# Patient Record
Sex: FEMALE | Race: WHITE | NOT HISPANIC OR LATINO | Employment: FULL TIME | URBAN - METROPOLITAN AREA
[De-identification: names, ages, dates, MRNs, and addresses within clinical notes are randomized per-mention and may not be internally consistent; named-entity substitution may affect disease eponyms.]

---

## 2020-06-02 ENCOUNTER — TELEPHONE (OUTPATIENT)
Dept: OBSTETRICS AND GYNECOLOGY | Facility: CLINIC | Age: 55
End: 2020-06-02

## 2020-06-02 NOTE — TELEPHONE ENCOUNTER
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

## 2020-08-04 ENCOUNTER — OFFICE VISIT (OUTPATIENT)
Dept: OBSTETRICS AND GYNECOLOGY | Facility: CLINIC | Age: 55
End: 2020-08-04
Payer: COMMERCIAL

## 2020-08-04 VITALS
DIASTOLIC BLOOD PRESSURE: 88 MMHG | SYSTOLIC BLOOD PRESSURE: 138 MMHG | BODY MASS INDEX: 18.16 KG/M2 | HEIGHT: 66 IN | WEIGHT: 113 LBS

## 2020-08-04 DIAGNOSIS — Z01.419 ENCOUNTER FOR ANNUAL ROUTINE GYNECOLOGICAL EXAMINATION: Primary | ICD-10-CM

## 2020-08-04 DIAGNOSIS — Z11.51 ENCOUNTER FOR SCREENING FOR HUMAN PAPILLOMAVIRUS (HPV): ICD-10-CM

## 2020-08-04 DIAGNOSIS — N94.10 FEMALE DYSPAREUNIA: ICD-10-CM

## 2020-08-04 DIAGNOSIS — Z12.31 ENCOUNTER FOR SCREENING MAMMOGRAM FOR BREAST CANCER: ICD-10-CM

## 2020-08-04 DIAGNOSIS — N95.1 PERIMENOPAUSE: ICD-10-CM

## 2020-08-04 DIAGNOSIS — Z12.4 ENCOUNTER FOR PAPANICOLAOU SMEAR FOR CERVICAL CANCER SCREENING: ICD-10-CM

## 2020-08-04 DIAGNOSIS — Z30.41 ENCOUNTER FOR SURVEILLANCE OF CONTRACEPTIVE PILLS: ICD-10-CM

## 2020-08-04 DIAGNOSIS — N39.3 STRESS INCONTINENCE: ICD-10-CM

## 2020-08-04 PROBLEM — Z80.0 FAMILY HISTORY OF COLON CANCER: Status: ACTIVE | Noted: 2020-08-04

## 2020-08-04 PROCEDURE — 88175 CYTOPATH C/V AUTO FLUID REDO: CPT

## 2020-08-04 PROCEDURE — 99396 PR PREVENTIVE VISIT,EST,40-64: ICD-10-PCS | Mod: S$GLB,,, | Performed by: OBSTETRICS & GYNECOLOGY

## 2020-08-04 PROCEDURE — 87624 HPV HI-RISK TYP POOLED RSLT: CPT

## 2020-08-04 PROCEDURE — 99999 PR PBB SHADOW E&M-EST. PATIENT-LVL III: ICD-10-PCS | Mod: PBBFAC,,, | Performed by: OBSTETRICS & GYNECOLOGY

## 2020-08-04 PROCEDURE — 3008F BODY MASS INDEX DOCD: CPT | Mod: CPTII,S$GLB,, | Performed by: OBSTETRICS & GYNECOLOGY

## 2020-08-04 PROCEDURE — 99999 PR PBB SHADOW E&M-EST. PATIENT-LVL III: CPT | Mod: PBBFAC,,, | Performed by: OBSTETRICS & GYNECOLOGY

## 2020-08-04 PROCEDURE — 87186 SC STD MICRODIL/AGAR DIL: CPT

## 2020-08-04 PROCEDURE — 87088 URINE BACTERIA CULTURE: CPT

## 2020-08-04 PROCEDURE — 3008F PR BODY MASS INDEX (BMI) DOCUMENTED: ICD-10-PCS | Mod: CPTII,S$GLB,, | Performed by: OBSTETRICS & GYNECOLOGY

## 2020-08-04 PROCEDURE — 87086 URINE CULTURE/COLONY COUNT: CPT

## 2020-08-04 PROCEDURE — 87077 CULTURE AEROBIC IDENTIFY: CPT

## 2020-08-04 PROCEDURE — 99396 PREV VISIT EST AGE 40-64: CPT | Mod: S$GLB,,, | Performed by: OBSTETRICS & GYNECOLOGY

## 2020-08-04 RX ORDER — ESTRADIOL 10 UG/1
1 INSERT VAGINAL NIGHTLY
Qty: 7 TABLET | Refills: 0 | Status: SHIPPED | OUTPATIENT
Start: 2020-08-04 | End: 2020-08-11

## 2020-08-04 RX ORDER — NORETHINDRONE ACETATE AND ETHINYL ESTRADIOL, ETHINYL ESTRADIOL AND FERROUS FUMARATE 1MG-10(24)
1 KIT ORAL DAILY
Qty: 84 TABLET | Refills: 3 | Status: SHIPPED | OUTPATIENT
Start: 2020-08-04 | End: 2021-09-28

## 2020-08-04 RX ORDER — LORATADINE 10 MG/1
10 TABLET ORAL DAILY
COMMUNITY

## 2020-08-04 RX ORDER — NORETHINDRONE ACETATE AND ETHINYL ESTRADIOL, ETHINYL ESTRADIOL AND FERROUS FUMARATE 1MG-10(24)
KIT ORAL
COMMUNITY
Start: 2020-05-02 | End: 2020-08-04 | Stop reason: SDUPTHER

## 2020-08-04 RX ORDER — DOCUSATE SODIUM 100 MG/1
100 CAPSULE, LIQUID FILLED ORAL
COMMUNITY
End: 2022-07-11

## 2020-08-04 RX ORDER — ESTRADIOL 10 UG/1
1 INSERT VAGINAL
Qty: 24 TABLET | Refills: 3 | Status: SHIPPED | OUTPATIENT
Start: 2020-08-06 | End: 2021-07-07 | Stop reason: SDUPTHER

## 2020-08-04 NOTE — PROGRESS NOTES
Chief Complaint: Well Woman Exam   Patient known to me.  HPI:      Erica Swann is a 54 y.o.  who presents for annual exam. She is currently complaining of problems with bladder.    She has had problems with her bladder since having children but worsening in past few years. Complains of leak with cough, sneeze or any activity. She had done Kegels with little improvement. No prior PFT or Urogyn evaluation. Has history of a fibroid pushing on bladder and feels urgency when lying down on her back. No hot flashes yet but does get pain with sex which is likely result of low-dose OCP and perimenopausal changes.    Ms. Swann is currently sexually active with a single male partner. She declines STD screening today. Patient has regular monthly menses which are very light if any bleeding on pill. Patient's last menstrual period was 2020 (approximate). She is currently using oral contraceptives (estrogen/progesterone) for contraception.    Previous Pap:  no abnormalities (No result found) 2018 at .  Previous Mammogram: 10/2019  Colonoscopy: with Dr. Zamorano in the past 5-7 years. Will call to check when due next.    Gardasil:Has never had     OB History        5    Para   1    Term   1            AB   4    Living   1       SAB   3    TAB        Ectopic   1    Multiple        Live Births   1             No abnormal pap or STDs    ROS:     GENERAL: Denies unintentional weight gain or weight loss. Feeling well overall.   SKIN: Denies rash or lesions.   HEENT: Denies headaches, or vision changes.   CARDIOVASCULAR: Denies palpitations or chest pain.   RESPIRATORY: Denies shortness of breath or dyspnea on exertion.  BREASTS: Denies pain, lumps, or nipple discharge.   ABDOMEN: Denies abdominal pain, constipation, diarrhea, nausea, vomiting, or change in appetite.   URINARY:Frequency, urgency when lying down, leak with cough, sneeze or moderate exertion/lifting.  NEUROLOGIC: Denies syncope or  "weakness.   PSYCHIATRIC: Denies depression, anxiety or mood swings.    Physical Exam:      PHYSICAL EXAM:  /88   Ht 5' 6" (1.676 m)   Wt 51.3 kg (113 lb)   LMP 07/21/2020 (Approximate)   BMI 18.24 kg/m²   Body mass index is 18.24 kg/m².     APPEARANCE: Well nourished, well developed, in no acute distress.  PSYCH: Appropriate mood and affect.  SKIN: No acne or hirsutism  NECK: Neck symmetric without masses or thyromegaly  NODES: No inguinal, axillary, or supraclavicular lymph node enlargement  CHEST: Normal respiratory effort.  ABDOMEN: Soft.  No tenderness or masses.   BREASTS: Symmetrical, no skin changes or visible lesions.  No palpable masses or nipple discharge bilaterally.  PELVIC: Normal external genitalia without lesions.  Normal hair distribution.  Adequate perineal body, normal urethral meatus.  Vagina with early atrophic changes and without lesions or discharge.  Cervix pink, without lesions, discharge or tenderness.  No significant cystocele or rectocele.  Bimanual exam shows uterus to be normal size with about 3-4cm anterior fibroid pushing into posterior bladder, mobile and nontender.  Adnexa without masses or tenderness.    EXTREMITIES: No edema.    Assessment/Plan:     Encounter for annual routine gynecological examination  Normal exam today with uterine fibroid noted as in past. Pap smear and HPV done. Mammgoram ordered. Discussed OCP continuance versus discontinuing and risks and benefits of both. She desires to continue the OCP. Reviewed stress incontinence and likely increased urge from location of fibroid. Will check urine culture and refer to PFT to see if can improve without surgery. Will also start Yuvafem for dyspareunia associated with vaginal atrophy likely to do with perimenopausal changes. Patient will send update.    Encounter for Papanicolaou smear for cervical cancer screening  -     Liquid-Based Pap Smear, Screening    Encounter for screening for human papillomavirus " (HPV)  -     HPV High Risk Genotypes, PCR    Encounter for screening mammogram for breast cancer  -     Mammo Digital Screening Bilat w/ Tariq; Future; Expected date: 08/04/2020    Encounter for surveillance of contraceptive pills  -     LO LOESTRIN FE 1 mg-10 mcg (24)/10 mcg (2) Tab; Take 1 tablet by mouth once daily.  Dispense: 84 tablet; Refill: 3    Stress incontinence  -     Urine culture  -     Ambulatory referral/consult to Physical/Occupational Therapy; Future; Expected date: 08/11/2020    Perimenopause  -     estradioL (YUVAFEM) 10 mcg Tab; Place 1 tablet (10 mcg total) vaginally every evening. for 7 days  Dispense: 7 tablet; Refill: 0  -     estradioL (YUVAFEM) 10 mcg Tab; Place 1 tablet (10 mcg total) vaginally twice a week.  Dispense: 24 tablet; Refill: 3    Female dyspareunia  -     Ambulatory referral/consult to Physical/Occupational Therapy; Future; Expected date: 08/11/2020  -     estradioL (YUVAFEM) 10 mcg Tab; Place 1 tablet (10 mcg total) vaginally every evening. for 7 days  Dispense: 7 tablet; Refill: 0  -     estradioL (YUVAFEM) 10 mcg Tab; Place 1 tablet (10 mcg total) vaginally twice a week.  Dispense: 24 tablet; Refill: 3    RTC 1 year    Counseling:     Patient was counseled today on current ASCCP pap guidelines, the recommendation for yearly pelvic exams, healthy diet and exercise routines, breast self awareness and annual mammograms. She is to see her PCP for other health maintenance.       Use of the [employer] Patient Portal discussed and encouraged during today's visit.       Nely Corley MD

## 2020-08-09 LAB — BACTERIA UR CULT: ABNORMAL

## 2020-08-11 ENCOUNTER — TELEPHONE (OUTPATIENT)
Dept: FAMILY MEDICINE | Facility: CLINIC | Age: 55
End: 2020-08-11

## 2020-08-11 DIAGNOSIS — N30.00 ACUTE CYSTITIS WITHOUT HEMATURIA: Primary | ICD-10-CM

## 2020-08-11 RX ORDER — NITROFURANTOIN 25; 75 MG/1; MG/1
100 CAPSULE ORAL 2 TIMES DAILY
Qty: 14 CAPSULE | Refills: 0 | Status: SHIPPED | OUTPATIENT
Start: 2020-08-11 | End: 2020-08-18

## 2020-08-11 NOTE — PROGRESS NOTES
Please notify that urine culture did show a low level of infection and would like to treat since she has had problems with her bladder. Sending Rx for Macrobid to the pharmacy. She will take twice daily for one week.  Maxine

## 2020-08-11 NOTE — TELEPHONE ENCOUNTER
----- Message from Nely Corley MD sent at 8/11/2020 11:02 AM CDT -----  Please notify that urine culture did show a low level of infection and would like to treat since she has had problems with her bladder. Sending Rx for Macrobid to the pharmacy. She will take twice daily for one week.  Maxine

## 2020-08-13 LAB
HPV HR 12 DNA SPEC QL NAA+PROBE: NEGATIVE
HPV16 AG SPEC QL: NEGATIVE
HPV18 DNA SPEC QL NAA+PROBE: NEGATIVE

## 2020-08-19 LAB
FINAL PATHOLOGIC DIAGNOSIS: NORMAL
Lab: NORMAL

## 2020-09-16 ENCOUNTER — TELEPHONE (OUTPATIENT)
Dept: OBSTETRICS AND GYNECOLOGY | Facility: CLINIC | Age: 55
End: 2020-09-16

## 2020-09-16 NOTE — TELEPHONE ENCOUNTER
----- Message from Nely Corley MD sent at 9/15/2020  9:38 AM CDT -----  Regarding: Colonoscopy  Please notify records received from GI and colonoscopy was July of 2014. Repeat was recommended in 5 years which would have been last July (2019). She can schedule on her own. Let us know if she needs any help with a new referral.   Maxine

## 2020-09-17 ENCOUNTER — CLINICAL SUPPORT (OUTPATIENT)
Dept: OTHER | Facility: CLINIC | Age: 55
End: 2020-09-17
Payer: COMMERCIAL

## 2020-09-17 DIAGNOSIS — Z00.8 ENCOUNTER FOR OTHER GENERAL EXAMINATION: ICD-10-CM

## 2020-10-02 VITALS — HEIGHT: 62 IN | BODY MASS INDEX: 20.67 KG/M2

## 2020-10-02 LAB
HDLC SERPL-MCNC: 101 MG/DL
POC CHOLESTEROL, TOTAL: 238 MG/DL
POC GLUCOSE, FASTING: 90 MG/DL (ref 60–110)
TRIGL SERPL-MCNC: 83 MG/DL

## 2020-10-19 ENCOUNTER — TELEPHONE (OUTPATIENT)
Dept: OBSTETRICS AND GYNECOLOGY | Facility: CLINIC | Age: 55
End: 2020-10-19

## 2020-10-19 DIAGNOSIS — B37.31 CANDIDAL VAGINITIS: Primary | ICD-10-CM

## 2020-10-19 NOTE — TELEPHONE ENCOUNTER
----- Message from Bianca Olguin sent at 10/19/2020 11:42 AM CDT -----  Type:  Needs Medical Advice    Who Called: Erica  Symptoms (please be specific): pt has a yeast infection and wants to know if Dr. Corley can call in a prescription for her   How long has patient had these symptoms:   2 days  Pharmacy name and phone #:   [id number] - [place], [place] - [phone] AT [facility] & [address] [phone] (Phone)  [phone] (Fax)  Would the patient rather a call back or a response via [facility]? Call back  Best Call Back Number: [phone]  Additional Information: none

## 2020-10-19 NOTE — TELEPHONE ENCOUNTER
Pt c/o vaginal itching, burning, small amount of thick, yellow discharge x2 days. She denies odor or any bladder symptoms. Pt advised to keep area clean and dry, wear loose fitting bottoms and cotton underwear, use pH balanced soap, and try AZO yeast tablets.     She is requesting diflucan because monistat has not worked well in the past.

## 2020-10-20 RX ORDER — FLUCONAZOLE 150 MG/1
150 TABLET ORAL
Qty: 2 TABLET | Refills: 0 | Status: SHIPPED | OUTPATIENT
Start: 2020-10-20 | End: 2020-10-24

## 2020-10-20 NOTE — TELEPHONE ENCOUNTER
Pt notified med sent to pharmacy and to follow up if symptoms unresolved after taking medication.

## 2020-10-30 ENCOUNTER — TELEPHONE (OUTPATIENT)
Dept: OBSTETRICS AND GYNECOLOGY | Facility: CLINIC | Age: 55
End: 2020-10-30

## 2020-10-30 NOTE — TELEPHONE ENCOUNTER
----- Message from Danielle Fonseca sent at 10/30/2020  2:31 PM CDT -----  Contact: [phone]/Self  Type:  Sooner Appointment Request     Caller is requesting a sooner appointment.  Caller declined first available appointment listed below.  Caller will not accept being placed on the waitlist and is requesting a message be sent to doctor.  Name of Caller: pt  When is the first available appointment? 11-  Symptoms or Reason: hemorrhoids    Would the patient rather a call back or a response via [facility]? Call back  Best Call Back Number: [phone]  Additional Information:

## 2020-12-22 ENCOUNTER — APPOINTMENT (OUTPATIENT)
Dept: RADIOLOGY | Facility: OTHER | Age: 55
End: 2020-12-22
Attending: OBSTETRICS & GYNECOLOGY
Payer: COMMERCIAL

## 2020-12-22 VITALS — HEIGHT: 62 IN | WEIGHT: 113.13 LBS | BODY MASS INDEX: 20.82 KG/M2

## 2020-12-22 DIAGNOSIS — Z12.31 ENCOUNTER FOR SCREENING MAMMOGRAM FOR BREAST CANCER: ICD-10-CM

## 2020-12-22 PROCEDURE — 77063 MAMMO DIGITAL SCREENING BILAT WITH TOMOSYNTHESIS_CAD: ICD-10-PCS | Mod: 26,,, | Performed by: RADIOLOGY

## 2020-12-22 PROCEDURE — 77067 MAMMO DIGITAL SCREENING BILAT WITH TOMOSYNTHESIS_CAD: ICD-10-PCS | Mod: 26,,, | Performed by: RADIOLOGY

## 2020-12-22 PROCEDURE — 77063 BREAST TOMOSYNTHESIS BI: CPT | Mod: 26,,, | Performed by: RADIOLOGY

## 2020-12-22 PROCEDURE — 77067 SCR MAMMO BI INCL CAD: CPT | Mod: TC,PN

## 2020-12-22 PROCEDURE — 77067 SCR MAMMO BI INCL CAD: CPT | Mod: 26,,, | Performed by: RADIOLOGY

## 2021-01-15 ENCOUNTER — TELEPHONE (OUTPATIENT)
Dept: OBSTETRICS AND GYNECOLOGY | Facility: CLINIC | Age: 56
End: 2021-01-15

## 2021-01-25 ENCOUNTER — TELEPHONE (OUTPATIENT)
Dept: RADIOLOGY | Facility: OTHER | Age: 56
End: 2021-01-25

## 2021-02-05 ENCOUNTER — TELEPHONE (OUTPATIENT)
Dept: RADIOLOGY | Facility: OTHER | Age: 56
End: 2021-02-05

## 2021-02-23 ENCOUNTER — PATIENT MESSAGE (OUTPATIENT)
Dept: OBSTETRICS AND GYNECOLOGY | Facility: CLINIC | Age: 56
End: 2021-02-23

## 2021-04-12 ENCOUNTER — PATIENT MESSAGE (OUTPATIENT)
Dept: RESEARCH | Facility: HOSPITAL | Age: 56
End: 2021-04-12

## 2021-06-14 ENCOUNTER — TELEPHONE (OUTPATIENT)
Dept: OBSTETRICS AND GYNECOLOGY | Facility: CLINIC | Age: 56
End: 2021-06-14

## 2021-06-14 DIAGNOSIS — R92.8 ABNORMAL MAMMOGRAM: Primary | ICD-10-CM

## 2021-07-06 ENCOUNTER — OFFICE VISIT (OUTPATIENT)
Dept: OBSTETRICS AND GYNECOLOGY | Facility: CLINIC | Age: 56
End: 2021-07-06
Payer: COMMERCIAL

## 2021-07-06 VITALS
DIASTOLIC BLOOD PRESSURE: 90 MMHG | BODY MASS INDEX: 19.71 KG/M2 | WEIGHT: 107.13 LBS | SYSTOLIC BLOOD PRESSURE: 132 MMHG | HEIGHT: 62 IN

## 2021-07-06 DIAGNOSIS — N94.10 FEMALE DYSPAREUNIA: ICD-10-CM

## 2021-07-06 DIAGNOSIS — Z01.419 ENCOUNTER FOR ANNUAL ROUTINE GYNECOLOGICAL EXAMINATION: Primary | ICD-10-CM

## 2021-07-06 DIAGNOSIS — N95.1 MENOPAUSAL STATE: ICD-10-CM

## 2021-07-06 PROCEDURE — 3008F BODY MASS INDEX DOCD: CPT | Mod: CPTII,S$GLB,, | Performed by: OBSTETRICS & GYNECOLOGY

## 2021-07-06 PROCEDURE — 99396 PREV VISIT EST AGE 40-64: CPT | Mod: S$GLB,,, | Performed by: OBSTETRICS & GYNECOLOGY

## 2021-07-06 PROCEDURE — 1126F AMNT PAIN NOTED NONE PRSNT: CPT | Mod: S$GLB,,, | Performed by: OBSTETRICS & GYNECOLOGY

## 2021-07-06 PROCEDURE — 99396 PR PREVENTIVE VISIT,EST,40-64: ICD-10-PCS | Mod: S$GLB,,, | Performed by: OBSTETRICS & GYNECOLOGY

## 2021-07-06 PROCEDURE — 99999 PR PBB SHADOW E&M-EST. PATIENT-LVL III: ICD-10-PCS | Mod: PBBFAC,,, | Performed by: OBSTETRICS & GYNECOLOGY

## 2021-07-06 PROCEDURE — 1126F PR PAIN SEVERITY QUANTIFIED, NO PAIN PRESENT: ICD-10-PCS | Mod: S$GLB,,, | Performed by: OBSTETRICS & GYNECOLOGY

## 2021-07-06 PROCEDURE — 99999 PR PBB SHADOW E&M-EST. PATIENT-LVL III: CPT | Mod: PBBFAC,,, | Performed by: OBSTETRICS & GYNECOLOGY

## 2021-07-06 PROCEDURE — 3008F PR BODY MASS INDEX (BMI) DOCUMENTED: ICD-10-PCS | Mod: CPTII,S$GLB,, | Performed by: OBSTETRICS & GYNECOLOGY

## 2021-07-06 RX ORDER — GABAPENTIN 100 MG/1
CAPSULE ORAL
COMMUNITY
Start: 2021-06-28

## 2021-07-06 RX ORDER — ESCITALOPRAM OXALATE 5 MG/1
TABLET ORAL
COMMUNITY
Start: 2021-04-30

## 2021-07-07 RX ORDER — ESTRADIOL 10 UG/1
1 INSERT VAGINAL
Qty: 24 TABLET | Refills: 3 | Status: SHIPPED | OUTPATIENT
Start: 2021-07-08 | End: 2022-07-08

## 2021-07-14 ENCOUNTER — TELEPHONE (OUTPATIENT)
Dept: OBSTETRICS AND GYNECOLOGY | Facility: CLINIC | Age: 56
End: 2021-07-14

## 2021-08-19 ENCOUNTER — LAB VISIT (OUTPATIENT)
Dept: LAB | Facility: HOSPITAL | Age: 56
End: 2021-08-19
Attending: OBSTETRICS & GYNECOLOGY
Payer: COMMERCIAL

## 2021-08-19 DIAGNOSIS — N95.1 MENOPAUSAL STATE: ICD-10-CM

## 2021-08-19 PROCEDURE — 82670 ASSAY OF TOTAL ESTRADIOL: CPT | Performed by: OBSTETRICS & GYNECOLOGY

## 2021-08-19 PROCEDURE — 83001 ASSAY OF GONADOTROPIN (FSH): CPT | Performed by: OBSTETRICS & GYNECOLOGY

## 2021-08-20 LAB
ESTRADIOL SERPL-MCNC: <10 PG/ML
FSH SERPL-ACNC: 71.88 MIU/ML

## 2021-09-27 ENCOUNTER — TELEPHONE (OUTPATIENT)
Dept: OBSTETRICS AND GYNECOLOGY | Facility: CLINIC | Age: 56
End: 2021-09-27

## 2021-09-28 ENCOUNTER — OFFICE VISIT (OUTPATIENT)
Dept: OBSTETRICS AND GYNECOLOGY | Facility: CLINIC | Age: 56
End: 2021-09-28
Payer: COMMERCIAL

## 2021-09-28 VITALS
DIASTOLIC BLOOD PRESSURE: 90 MMHG | WEIGHT: 108.38 LBS | HEIGHT: 62 IN | SYSTOLIC BLOOD PRESSURE: 130 MMHG | BODY MASS INDEX: 19.94 KG/M2

## 2021-09-28 DIAGNOSIS — N95.1 MENOPAUSAL STATE: Primary | ICD-10-CM

## 2021-09-28 PROCEDURE — 1159F PR MEDICATION LIST DOCUMENTED IN MEDICAL RECORD: ICD-10-PCS | Mod: CPTII,S$GLB,, | Performed by: OBSTETRICS & GYNECOLOGY

## 2021-09-28 PROCEDURE — 3080F PR MOST RECENT DIASTOLIC BLOOD PRESSURE >= 90 MM HG: ICD-10-PCS | Mod: CPTII,S$GLB,, | Performed by: OBSTETRICS & GYNECOLOGY

## 2021-09-28 PROCEDURE — 99999 PR PBB SHADOW E&M-EST. PATIENT-LVL III: ICD-10-PCS | Mod: PBBFAC,,, | Performed by: OBSTETRICS & GYNECOLOGY

## 2021-09-28 PROCEDURE — 99999 PR PBB SHADOW E&M-EST. PATIENT-LVL III: CPT | Mod: PBBFAC,,, | Performed by: OBSTETRICS & GYNECOLOGY

## 2021-09-28 PROCEDURE — 99213 OFFICE O/P EST LOW 20 MIN: CPT | Mod: S$GLB,,, | Performed by: OBSTETRICS & GYNECOLOGY

## 2021-09-28 PROCEDURE — 3075F SYST BP GE 130 - 139MM HG: CPT | Mod: CPTII,S$GLB,, | Performed by: OBSTETRICS & GYNECOLOGY

## 2021-09-28 PROCEDURE — 1159F MED LIST DOCD IN RCRD: CPT | Mod: CPTII,S$GLB,, | Performed by: OBSTETRICS & GYNECOLOGY

## 2021-09-28 PROCEDURE — 3008F PR BODY MASS INDEX (BMI) DOCUMENTED: ICD-10-PCS | Mod: CPTII,S$GLB,, | Performed by: OBSTETRICS & GYNECOLOGY

## 2021-09-28 PROCEDURE — 1160F PR REVIEW ALL MEDS BY PRESCRIBER/CLIN PHARMACIST DOCUMENTED: ICD-10-PCS | Mod: CPTII,S$GLB,, | Performed by: OBSTETRICS & GYNECOLOGY

## 2021-09-28 PROCEDURE — 3075F PR MOST RECENT SYSTOLIC BLOOD PRESS GE 130-139MM HG: ICD-10-PCS | Mod: CPTII,S$GLB,, | Performed by: OBSTETRICS & GYNECOLOGY

## 2021-09-28 PROCEDURE — 1160F RVW MEDS BY RX/DR IN RCRD: CPT | Mod: CPTII,S$GLB,, | Performed by: OBSTETRICS & GYNECOLOGY

## 2021-09-28 PROCEDURE — 3008F BODY MASS INDEX DOCD: CPT | Mod: CPTII,S$GLB,, | Performed by: OBSTETRICS & GYNECOLOGY

## 2021-09-28 PROCEDURE — 99213 PR OFFICE/OUTPT VISIT, EST, LEVL III, 20-29 MIN: ICD-10-PCS | Mod: S$GLB,,, | Performed by: OBSTETRICS & GYNECOLOGY

## 2021-09-28 PROCEDURE — 3080F DIAST BP >= 90 MM HG: CPT | Mod: CPTII,S$GLB,, | Performed by: OBSTETRICS & GYNECOLOGY

## 2021-10-09 ENCOUNTER — IMMUNIZATION (OUTPATIENT)
Dept: PRIMARY CARE CLINIC | Facility: CLINIC | Age: 56
End: 2021-10-09
Payer: COMMERCIAL

## 2021-10-09 DIAGNOSIS — Z23 NEED FOR VACCINATION: Primary | ICD-10-CM

## 2021-10-09 PROCEDURE — 91300 COVID-19, MRNA, LNP-S, PF, 30 MCG/0.3 ML DOSE VACCINE: CPT | Mod: PBBFAC | Performed by: INTERNAL MEDICINE

## 2021-10-09 PROCEDURE — 0003A COVID-19, MRNA, LNP-S, PF, 30 MCG/0.3 ML DOSE VACCINE: CPT | Mod: CV19,PBBFAC | Performed by: INTERNAL MEDICINE

## 2021-10-22 ENCOUNTER — OFFICE VISIT (OUTPATIENT)
Dept: OBSTETRICS AND GYNECOLOGY | Facility: CLINIC | Age: 56
End: 2021-10-22
Payer: COMMERCIAL

## 2021-10-22 DIAGNOSIS — Z79.890 HORMONE REPLACEMENT THERAPY: ICD-10-CM

## 2021-10-22 DIAGNOSIS — N95.1 MENOPAUSAL STATE: Primary | ICD-10-CM

## 2021-10-22 PROCEDURE — 99212 OFFICE O/P EST SF 10 MIN: CPT | Mod: 95,,, | Performed by: OBSTETRICS & GYNECOLOGY

## 2021-10-22 PROCEDURE — 1160F RVW MEDS BY RX/DR IN RCRD: CPT | Mod: CPTII,95,, | Performed by: OBSTETRICS & GYNECOLOGY

## 2021-10-22 PROCEDURE — 99212 PR OFFICE/OUTPT VISIT, EST, LEVL II, 10-19 MIN: ICD-10-PCS | Mod: 95,,, | Performed by: OBSTETRICS & GYNECOLOGY

## 2021-10-22 PROCEDURE — 1160F PR REVIEW ALL MEDS BY PRESCRIBER/CLIN PHARMACIST DOCUMENTED: ICD-10-PCS | Mod: CPTII,95,, | Performed by: OBSTETRICS & GYNECOLOGY

## 2021-10-22 PROCEDURE — 1159F MED LIST DOCD IN RCRD: CPT | Mod: CPTII,95,, | Performed by: OBSTETRICS & GYNECOLOGY

## 2021-10-22 PROCEDURE — 1159F PR MEDICATION LIST DOCUMENTED IN MEDICAL RECORD: ICD-10-PCS | Mod: CPTII,95,, | Performed by: OBSTETRICS & GYNECOLOGY

## 2021-10-22 RX ORDER — ESTRADIOL 1 MG/1
1 TABLET ORAL DAILY
Qty: 90 TABLET | Refills: 3 | Status: SHIPPED | OUTPATIENT
Start: 2021-10-22 | End: 2022-07-28 | Stop reason: SDUPTHER

## 2021-10-22 RX ORDER — PROGESTERONE 100 MG/1
100 CAPSULE ORAL NIGHTLY
Qty: 90 CAPSULE | Refills: 3 | Status: SHIPPED | OUTPATIENT
Start: 2021-10-22 | End: 2022-07-28 | Stop reason: SDUPTHER

## 2021-11-08 ENCOUNTER — PATIENT MESSAGE (OUTPATIENT)
Dept: OBSTETRICS AND GYNECOLOGY | Facility: CLINIC | Age: 56
End: 2021-11-08
Payer: COMMERCIAL

## 2021-11-08 DIAGNOSIS — N60.01 BREAST CYST, RIGHT: Primary | ICD-10-CM

## 2022-01-06 ENCOUNTER — LAB VISIT (OUTPATIENT)
Dept: PRIMARY CARE CLINIC | Facility: CLINIC | Age: 57
End: 2022-01-06
Payer: COMMERCIAL

## 2022-01-06 DIAGNOSIS — Z20.822 CONTACT WITH AND (SUSPECTED) EXPOSURE TO COVID-19: ICD-10-CM

## 2022-01-06 LAB
CTP QC/QA: YES
SARS-COV-2 AG RESP QL IA.RAPID: NEGATIVE

## 2022-01-06 PROCEDURE — 87811 SARS-COV-2 COVID19 W/OPTIC: CPT

## 2022-02-25 ENCOUNTER — TELEPHONE (OUTPATIENT)
Dept: OBSTETRICS AND GYNECOLOGY | Facility: CLINIC | Age: 57
End: 2022-02-25
Payer: COMMERCIAL

## 2022-02-25 DIAGNOSIS — Z12.31 BREAST CANCER SCREENING BY MAMMOGRAM: Primary | ICD-10-CM

## 2022-02-25 DIAGNOSIS — N60.01 BREAST CYST, RIGHT: ICD-10-CM

## 2022-02-28 ENCOUNTER — TELEPHONE (OUTPATIENT)
Dept: OBSTETRICS AND GYNECOLOGY | Facility: CLINIC | Age: 57
End: 2022-02-28
Payer: COMMERCIAL

## 2022-02-28 DIAGNOSIS — R92.8 ABNORMAL MAMMOGRAM: Primary | ICD-10-CM

## 2022-05-15 ENCOUNTER — IMMUNIZATION (OUTPATIENT)
Dept: PRIMARY CARE CLINIC | Facility: CLINIC | Age: 57
End: 2022-05-15
Payer: COMMERCIAL

## 2022-05-15 DIAGNOSIS — Z23 NEED FOR VACCINATION: Primary | ICD-10-CM

## 2022-05-15 PROCEDURE — 91305 COVID-19, MRNA, LNP-S, PF, 30 MCG/0.3 ML DOSE VACCINE (PFIZER): CPT | Mod: PBBFAC | Performed by: INTERNAL MEDICINE

## 2022-07-07 ENCOUNTER — PATIENT MESSAGE (OUTPATIENT)
Dept: ENDOCRINOLOGY | Facility: CLINIC | Age: 57
End: 2022-07-07
Payer: COMMERCIAL

## 2022-07-11 ENCOUNTER — OFFICE VISIT (OUTPATIENT)
Dept: ENDOCRINOLOGY | Facility: CLINIC | Age: 57
End: 2022-07-11
Payer: COMMERCIAL

## 2022-07-11 VITALS
SYSTOLIC BLOOD PRESSURE: 136 MMHG | WEIGHT: 111.31 LBS | HEIGHT: 62 IN | DIASTOLIC BLOOD PRESSURE: 88 MMHG | HEART RATE: 75 BPM | OXYGEN SATURATION: 98 % | BODY MASS INDEX: 20.48 KG/M2

## 2022-07-11 DIAGNOSIS — E89.40 ASYMPTOMATIC POSTSURGICAL MENOPAUSE: ICD-10-CM

## 2022-07-11 DIAGNOSIS — E05.90 HYPERTHYROIDISM: Primary | ICD-10-CM

## 2022-07-11 DIAGNOSIS — E04.1 THYROID NODULE: ICD-10-CM

## 2022-07-11 DIAGNOSIS — E05.90 SUBCLINICAL HYPERTHYROIDISM: ICD-10-CM

## 2022-07-11 PROCEDURE — 1159F MED LIST DOCD IN RCRD: CPT | Mod: CPTII,S$GLB,, | Performed by: INTERNAL MEDICINE

## 2022-07-11 PROCEDURE — 3075F PR MOST RECENT SYSTOLIC BLOOD PRESS GE 130-139MM HG: ICD-10-PCS | Mod: CPTII,S$GLB,, | Performed by: INTERNAL MEDICINE

## 2022-07-11 PROCEDURE — 3079F PR MOST RECENT DIASTOLIC BLOOD PRESSURE 80-89 MM HG: ICD-10-PCS | Mod: CPTII,S$GLB,, | Performed by: INTERNAL MEDICINE

## 2022-07-11 PROCEDURE — 3079F DIAST BP 80-89 MM HG: CPT | Mod: CPTII,S$GLB,, | Performed by: INTERNAL MEDICINE

## 2022-07-11 PROCEDURE — 99204 OFFICE O/P NEW MOD 45 MIN: CPT | Mod: S$GLB,,, | Performed by: INTERNAL MEDICINE

## 2022-07-11 PROCEDURE — 3008F BODY MASS INDEX DOCD: CPT | Mod: CPTII,S$GLB,, | Performed by: INTERNAL MEDICINE

## 2022-07-11 PROCEDURE — 1159F PR MEDICATION LIST DOCUMENTED IN MEDICAL RECORD: ICD-10-PCS | Mod: CPTII,S$GLB,, | Performed by: INTERNAL MEDICINE

## 2022-07-11 PROCEDURE — 99999 PR PBB SHADOW E&M-EST. PATIENT-LVL IV: ICD-10-PCS | Mod: PBBFAC,,, | Performed by: INTERNAL MEDICINE

## 2022-07-11 PROCEDURE — 1160F RVW MEDS BY RX/DR IN RCRD: CPT | Mod: CPTII,S$GLB,, | Performed by: INTERNAL MEDICINE

## 2022-07-11 PROCEDURE — 3075F SYST BP GE 130 - 139MM HG: CPT | Mod: CPTII,S$GLB,, | Performed by: INTERNAL MEDICINE

## 2022-07-11 PROCEDURE — 99204 PR OFFICE/OUTPT VISIT, NEW, LEVL IV, 45-59 MIN: ICD-10-PCS | Mod: S$GLB,,, | Performed by: INTERNAL MEDICINE

## 2022-07-11 PROCEDURE — 1160F PR REVIEW ALL MEDS BY PRESCRIBER/CLIN PHARMACIST DOCUMENTED: ICD-10-PCS | Mod: CPTII,S$GLB,, | Performed by: INTERNAL MEDICINE

## 2022-07-11 PROCEDURE — 99999 PR PBB SHADOW E&M-EST. PATIENT-LVL IV: CPT | Mod: PBBFAC,,, | Performed by: INTERNAL MEDICINE

## 2022-07-11 PROCEDURE — 3008F PR BODY MASS INDEX (BMI) DOCUMENTED: ICD-10-PCS | Mod: CPTII,S$GLB,, | Performed by: INTERNAL MEDICINE

## 2022-07-11 NOTE — ASSESSMENT & PLAN NOTE
Discussed possible etiologies including Graves' disease, thyroid nodules, or thyroiditis as well as indications for treatment including age >65, TSH <0.1, a fib, osteoporosis or symptoms. Currently without indication for treatment although will obtain DXA. May also normalize thyroid function to facilitate FNA

## 2022-07-11 NOTE — PROGRESS NOTES
Erica Swann is a 56 y.o. female referred by [facility] Self for evaluation of thyroid nodule, subclinical hyperthyroidism    History of Present Illness  Thyroid nodule  Found on exam by internist    Had US at [facility] with left lobe nodule measuring 2.6 x 1.1 x 1.2 cm- oval circumscribed hypoechoic solid nodule with heterogeneous parenchyma and increased vascularity  TIRADS 5 and FNA recommended    Labs with subclinical hyperthyroidism as below. Denies prior history of thyroid disease and previous TSHs all normal    Denies symptoms of hyperthyroidism    Risk Factors for Thyroid Cancer:  Hx of External Beam Radiation:denies  Family Hx of Thyroid Cancer: denies    Denies rapid neck enlargement, difficulty swallowing, SOB, or hoarseness.     Father maybe with nodule    6/30/22:  TSH 0.34  FT4 0.9    6/15/22:  TSH 0.34    7/6/22:  TSH 0.22    On HRT prescribed by gyn      Current Outpatient Medications:     calcium carbonate/vitamin D3 (CALCIUM WITH VITAMIN D ORAL), Take by mouth., Disp: , Rfl:     EScitalopram oxalate (LEXAPRO) 5 MG Tab, , Disp: , Rfl:     estradioL (ESTRACE) 1 MG tablet, Take 1 tablet (1 mg total) by mouth once daily., Disp: 90 tablet, Rfl: 3    gabapentin (NEURONTIN) 100 MG capsule, Take by mouth., Disp: , Rfl:     linaCLOtide (LINZESS) 72 mcg Cap capsule, Take 72 mcg by mouth before breakfast., Disp: , Rfl:     loratadine (CLARITIN) 10 mg tablet, Take 10 mg by mouth once daily., Disp: , Rfl:     progesterone (PROMETRIUM) 100 MG capsule, Take 1 capsule (100 mg total) by mouth nightly., Disp: 90 capsule, Rfl: 3    triamcinolone acetonide (NASACORT NASL), by Nasal route., Disp: , Rfl:     docusate sodium (COLACE) 100 MG capsule, Take 100 mg by mouth., Disp: , Rfl:     ROS as above    Objective:     Vitals:    07/11/22 0847   BP: 136/88   Pulse: 75     Wt Readings from Last 3 Encounters:   07/11/22 50.5 kg (111 lb 5.3 oz)   09/28/21 49.2 kg (108 lb 5.7 oz)   07/06/21 48.6 kg (107 lb 2.3 oz)      Body mass index is 20.36 kg/m².  Physical Exam  Constitutional:       Appearance: She is well-developed.   HENT:      Head: Normocephalic.   Eyes:      Conjunctiva/sclera: Conjunctivae normal.   Neck:      Comments: No tenderness  Left lobe nodule   Pulmonary:      Effort: Pulmonary effort is normal.   Musculoskeletal:         General: Normal range of motion.   Skin:     General: Skin is warm.      Findings: No rash.   Neurological:      Mental Status: She is alert and oriented to person, place, and time.         Labs    Chemistry    No results found for: NA, K, CL, CO2, BUN, CREATININE, GLU No results found for: CALCIUM, ALKPHOS, AST, ALT, BILITOT, ESTGFRAFRICA, EGFRNONAA           Assessment and Plan     Thyroid nodule  US reviewed and with left lobe nodule meeting FNA criteria  No compressive symptoms  TSH suppressed. Will further evaluate with uptake and scan but even if hot nodule would likely FNA given features but would first normalize thyroid function  Procedure reviewed in detail  Discussed possible outcomes of FNA including malignancy, benign, unsatisfactory, indeterminate  Discussed indications for surgery      Subclinical hyperthyroidism  Discussed possible etiologies including Graves' disease, thyroid nodules, or thyroiditis as well as indications for treatment including age >65, TSH <0.1, a fib, osteoporosis or symptoms. Currently without indication for treatment although will obtain DXA. May also normalize thyroid function to facilitate FNA          RTC 6 months        Erin Morales MD

## 2022-07-11 NOTE — ASSESSMENT & PLAN NOTE
US reviewed and with left lobe nodule meeting FNA criteria  No compressive symptoms  TSH suppressed. Will further evaluate with uptake and scan but even if hot nodule would likely FNA given features but would first normalize thyroid function  Procedure reviewed in detail  Discussed possible outcomes of FNA including malignancy, benign, unsatisfactory, indeterminate  Discussed indications for surgery

## 2022-07-14 ENCOUNTER — APPOINTMENT (OUTPATIENT)
Dept: RADIOLOGY | Facility: CLINIC | Age: 57
End: 2022-07-14
Attending: INTERNAL MEDICINE
Payer: COMMERCIAL

## 2022-07-14 DIAGNOSIS — E89.40 ASYMPTOMATIC POSTSURGICAL MENOPAUSE: ICD-10-CM

## 2022-07-14 PROCEDURE — 77080 DXA BONE DENSITY AXIAL: CPT | Mod: 26,,, | Performed by: INTERNAL MEDICINE

## 2022-07-14 PROCEDURE — 77080 DEXA BONE DENSITY SPINE HIP: ICD-10-PCS | Mod: 26,,, | Performed by: INTERNAL MEDICINE

## 2022-07-14 PROCEDURE — 77080 DXA BONE DENSITY AXIAL: CPT | Mod: TC,PO

## 2022-07-28 ENCOUNTER — OFFICE VISIT (OUTPATIENT)
Dept: OBSTETRICS AND GYNECOLOGY | Facility: CLINIC | Age: 57
End: 2022-07-28
Payer: COMMERCIAL

## 2022-07-28 ENCOUNTER — HOSPITAL ENCOUNTER (OUTPATIENT)
Dept: RADIOLOGY | Facility: HOSPITAL | Age: 57
Discharge: HOME | End: 2022-07-28
Attending: INTERNAL MEDICINE
Payer: COMMERCIAL

## 2022-07-28 VITALS
HEIGHT: 62 IN | BODY MASS INDEX: 20.47 KG/M2 | WEIGHT: 111.25 LBS | DIASTOLIC BLOOD PRESSURE: 70 MMHG | SYSTOLIC BLOOD PRESSURE: 120 MMHG

## 2022-07-28 DIAGNOSIS — Z79.890 HORMONE REPLACEMENT THERAPY: ICD-10-CM

## 2022-07-28 DIAGNOSIS — E05.90 HYPERTHYROIDISM: ICD-10-CM

## 2022-07-28 DIAGNOSIS — N95.1 MENOPAUSAL STATE: ICD-10-CM

## 2022-07-28 DIAGNOSIS — Z01.419 ENCOUNTER FOR ANNUAL ROUTINE GYNECOLOGICAL EXAMINATION: Primary | ICD-10-CM

## 2022-07-28 PROCEDURE — 1160F PR REVIEW ALL MEDS BY PRESCRIBER/CLIN PHARMACIST DOCUMENTED: ICD-10-PCS | Mod: CPTII,S$GLB,, | Performed by: OBSTETRICS & GYNECOLOGY

## 2022-07-28 PROCEDURE — 3074F PR MOST RECENT SYSTOLIC BLOOD PRESSURE < 130 MM HG: ICD-10-PCS | Mod: CPTII,S$GLB,, | Performed by: OBSTETRICS & GYNECOLOGY

## 2022-07-28 PROCEDURE — 99396 PREV VISIT EST AGE 40-64: CPT | Mod: S$GLB,,, | Performed by: OBSTETRICS & GYNECOLOGY

## 2022-07-28 PROCEDURE — 3078F PR MOST RECENT DIASTOLIC BLOOD PRESSURE < 80 MM HG: ICD-10-PCS | Mod: CPTII,S$GLB,, | Performed by: OBSTETRICS & GYNECOLOGY

## 2022-07-28 PROCEDURE — 1160F RVW MEDS BY RX/DR IN RCRD: CPT | Mod: CPTII,S$GLB,, | Performed by: OBSTETRICS & GYNECOLOGY

## 2022-07-28 PROCEDURE — 1159F MED LIST DOCD IN RCRD: CPT | Mod: CPTII,S$GLB,, | Performed by: OBSTETRICS & GYNECOLOGY

## 2022-07-28 PROCEDURE — 3078F DIAST BP <80 MM HG: CPT | Mod: CPTII,S$GLB,, | Performed by: OBSTETRICS & GYNECOLOGY

## 2022-07-28 PROCEDURE — 99396 PR PREVENTIVE VISIT,EST,40-64: ICD-10-PCS | Mod: S$GLB,,, | Performed by: OBSTETRICS & GYNECOLOGY

## 2022-07-28 PROCEDURE — 3008F BODY MASS INDEX DOCD: CPT | Mod: CPTII,S$GLB,, | Performed by: OBSTETRICS & GYNECOLOGY

## 2022-07-28 PROCEDURE — 3008F PR BODY MASS INDEX (BMI) DOCUMENTED: ICD-10-PCS | Mod: CPTII,S$GLB,, | Performed by: OBSTETRICS & GYNECOLOGY

## 2022-07-28 PROCEDURE — 78014 THYROID IMAGING W/BLOOD FLOW: CPT | Mod: 26,,, | Performed by: RADIOLOGY

## 2022-07-28 PROCEDURE — 78014 NM THYROID UPTAKE AND SCAN: ICD-10-PCS | Mod: 26,,, | Performed by: RADIOLOGY

## 2022-07-28 PROCEDURE — 1159F PR MEDICATION LIST DOCUMENTED IN MEDICAL RECORD: ICD-10-PCS | Mod: CPTII,S$GLB,, | Performed by: OBSTETRICS & GYNECOLOGY

## 2022-07-28 PROCEDURE — A9516 IODINE I-123 SOD IODIDE MIC: HCPCS

## 2022-07-28 PROCEDURE — 3074F SYST BP LT 130 MM HG: CPT | Mod: CPTII,S$GLB,, | Performed by: OBSTETRICS & GYNECOLOGY

## 2022-07-28 PROCEDURE — 99999 PR PBB SHADOW E&M-EST. PATIENT-LVL III: ICD-10-PCS | Mod: PBBFAC,,, | Performed by: OBSTETRICS & GYNECOLOGY

## 2022-07-28 PROCEDURE — 99999 PR PBB SHADOW E&M-EST. PATIENT-LVL III: CPT | Mod: PBBFAC,,, | Performed by: OBSTETRICS & GYNECOLOGY

## 2022-07-28 RX ORDER — PROGESTERONE 100 MG/1
100 CAPSULE ORAL NIGHTLY
Qty: 90 CAPSULE | Refills: 3 | Status: SHIPPED | OUTPATIENT
Start: 2022-07-28 | End: 2023-07-17 | Stop reason: SDUPTHER

## 2022-07-28 RX ORDER — ESTRADIOL 1 MG/1
1 TABLET ORAL DAILY
Qty: 90 TABLET | Refills: 3 | Status: SHIPPED | OUTPATIENT
Start: 2022-07-28 | End: 2023-07-17 | Stop reason: SDUPTHER

## 2022-07-28 RX ORDER — ESTRADIOL 10 UG/1
INSERT VAGINAL
COMMUNITY
Start: 2022-05-17 | End: 2022-08-22

## 2022-07-28 RX ORDER — TRETINOIN 0.25 MG/G
CREAM TOPICAL
COMMUNITY
Start: 2022-07-26

## 2022-07-29 ENCOUNTER — HOSPITAL ENCOUNTER (OUTPATIENT)
Dept: RADIOLOGY | Facility: HOSPITAL | Age: 57
Discharge: HOME | End: 2022-07-29
Attending: INTERNAL MEDICINE
Payer: COMMERCIAL

## 2022-08-05 ENCOUNTER — PATIENT MESSAGE (OUTPATIENT)
Dept: ENDOCRINOLOGY | Facility: CLINIC | Age: 57
End: 2022-08-05
Payer: COMMERCIAL

## 2022-08-08 ENCOUNTER — PATIENT MESSAGE (OUTPATIENT)
Dept: ENDOCRINOLOGY | Facility: CLINIC | Age: 57
End: 2022-08-08
Payer: COMMERCIAL

## 2022-08-12 ENCOUNTER — OFFICE VISIT (OUTPATIENT)
Dept: ENDOCRINOLOGY | Facility: CLINIC | Age: 57
End: 2022-08-12
Payer: COMMERCIAL

## 2022-08-12 DIAGNOSIS — E05.90 HYPERTHYROIDISM: Primary | ICD-10-CM

## 2022-08-12 DIAGNOSIS — M85.80 OSTEOPENIA, UNSPECIFIED LOCATION: ICD-10-CM

## 2022-08-12 DIAGNOSIS — E05.90 SUBCLINICAL HYPERTHYROIDISM: ICD-10-CM

## 2022-08-12 DIAGNOSIS — E04.1 THYROID NODULE: ICD-10-CM

## 2022-08-12 PROCEDURE — 1159F MED LIST DOCD IN RCRD: CPT | Mod: CPTII,95,, | Performed by: INTERNAL MEDICINE

## 2022-08-12 PROCEDURE — 99214 OFFICE O/P EST MOD 30 MIN: CPT | Mod: 95,,, | Performed by: INTERNAL MEDICINE

## 2022-08-12 PROCEDURE — 1159F PR MEDICATION LIST DOCUMENTED IN MEDICAL RECORD: ICD-10-PCS | Mod: CPTII,95,, | Performed by: INTERNAL MEDICINE

## 2022-08-12 PROCEDURE — 1160F PR REVIEW ALL MEDS BY PRESCRIBER/CLIN PHARMACIST DOCUMENTED: ICD-10-PCS | Mod: CPTII,95,, | Performed by: INTERNAL MEDICINE

## 2022-08-12 PROCEDURE — 99214 PR OFFICE/OUTPT VISIT, EST, LEVL IV, 30-39 MIN: ICD-10-PCS | Mod: 95,,, | Performed by: INTERNAL MEDICINE

## 2022-08-12 PROCEDURE — 1160F RVW MEDS BY RX/DR IN RCRD: CPT | Mod: CPTII,95,, | Performed by: INTERNAL MEDICINE

## 2022-08-12 NOTE — PROGRESS NOTES
Erica Swann is a 56 y.o. 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, thyroid nodule    The patient location is: work in [place]  The chief complaint leading to consultation is:   Chief Complaint   Patient presents with    Thyroid Nodule    Hyperthyroidism       Visit type: audiovisual    Face to Face time with patient: 15 minutes  25 minutes of total time spent on the encounter, which includes face to face time and non-face to face time preparing to see the patient (eg, review of tests), Obtaining and/or reviewing separately obtained history, Documenting clinical information in the electronic or other health record, Independently interpreting results (not separately reported) and communicating results to the patient/family/caregiver, or Care coordination (not separately reported).    Each patient to whom he or she provides medical services by telemedicine is:  (1) informed of the relationship between the physician and patient and the respective role of any other health care provider with respect to management of the patient; and (2) notified that he or she may decline to receive medical services by telemedicine and may withdraw from such care at any time.      History of Present Illness  Thyroid nodule  Found on exam by internist    Had [facility] at [facility] with left lobe nodule measuring 2.6 x 1.1 x 1.2 cm- oval circumscribed hypoechoic solid nodule with heterogeneous parenchyma and increased vascularity  TIRADS 5 and FNA recommended    Labs with subclinical hyperthyroidism as below beginning in June 2022. Denies prior history of thyroid disease and previous TSHs all normal    Denies symptoms of hyperthyroidism    Risk Factors for Thyroid Cancer:  Hx of External Beam Radiation:denies  Family Hx of Thyroid Cancer: denies    Denies rapid neck enlargement, difficulty swallowing, SOB, or hoarseness.     Father maybe with nodule    Thyroid uptake and scan 7/2022:  FINDINGS:  The 24 hour  uptake is normal at 14.8 % (normal 10-30%).   Thyroid gland is normal shape and size.  There is focally increased uptake at the pole of the left thyroid lobe with relative suppression of uptake in the remainder of the gland.     Impression:   1. Normal 24 hour radioiodine uptake by the thyroid.  2. 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    No results found for: TSH    6/30/22:  TSH 0.34  FT4 0.9    6/15/22:  TSH 0.34    7/6/22:  TSH 0.22    On HRT prescribed by gyn      Current Outpatient Medications:     calcium carbonate/vitamin D3 (CALCIUM WITH VITAMIN D ORAL), Take by mouth., Disp: , Rfl:     EScitalopram oxalate (LEXAPRO) 5 MG Tab, , Disp: , Rfl:     estradioL (ESTRACE) 1 MG tablet, Take 1 tablet (1 mg total) by mouth once daily., Disp: 90 tablet, Rfl: 3    estradioL (VAGIFEM) 10 mcg Tab, , Disp: , Rfl:     gabapentin (NEURONTIN) 100 MG capsule, Take by mouth., Disp: , Rfl:     loratadine (CLARITIN) 10 mg tablet, Take 10 mg by mouth once daily., Disp: , Rfl:     progesterone (PROMETRIUM) 100 MG capsule, Take 1 capsule (100 mg total) by mouth nightly., Disp: 90 capsule, Rfl: 3    tretinoin (RETIN-A) 0.025 % cream, , Disp: , Rfl:     triamcinolone acetonide (NASACORT NASL), by Nasal route., Disp: , Rfl:     ROS as above    Objective:     There were no vitals filed for this visit.  Wt Readings from Last 3 Encounters:   07/28/22 50.4 kg (111 lb 3.6 oz)   07/11/22 50.5 kg (111 lb 5.3 oz)   09/28/21 49.2 kg (108 lb 5.7 oz)     There is no height or weight on file to calculate BMI.    Labs    Chemistry    No results found for: NA, K, CL, CO2, BUN, CREATININE, GLU No results found for: CALCIUM, ALKPHOS, AST, ALT, BILITOT, ESTGFRAFRICA, EGFRNONAA           Assessment and Plan     Subclinical hyperthyroidism  Due to toxic adenoma  Discussed indications for treatment which she does not meet at this time although with osteopenia. Will likely need treatment in future as toxic adenoma  would not resolve without treatment  Reviewed treatment options for hyperthyroidism with definitive treatment best for adenoma  She will get disc of images for me to review. If needs FNA of other nodules will arrange (one 1.2 cm hypoechoic nodule per report) as would determine if needs surgery before planning for ZHOU  No urgency for treatment at this time  TSH in 3 months. Reviewed symptoms of hyperthyroidism        Thyroid nodule  Nodule for which FNA recommended is hot and given very low risk of malignancy will hold off on FNA  Review images once she brings disc to determine if another nodule needs FNA    Osteopenia  Does not meet criteria for treatment but is getting bone protection from estrogen  Repeat in 2 years  If progresses to osteoporosis particularly while on treatment with estrogen would treat hyperthyroidism        RTC pending above        Erin Morales MD

## 2022-08-12 NOTE — ASSESSMENT & PLAN NOTE
Does not meet criteria for treatment but is getting bone protection from estrogen  Repeat in 2 years  If progresses to osteoporosis particularly while on treatment with estrogen would treat hyperthyroidism

## 2022-08-12 NOTE — ASSESSMENT & PLAN NOTE
Nodule for which FNA recommended is hot and given very low risk of malignancy will hold off on FNA  Review images once she brings disc to determine if another nodule needs FNA

## 2022-08-12 NOTE — ASSESSMENT & PLAN NOTE
Due to toxic adenoma  Discussed indications for treatment which she does not meet at this time although with osteopenia. Will likely need treatment in future as toxic adenoma would not resolve without treatment  Reviewed treatment options for hyperthyroidism with definitive treatment best for adenoma  She will get disc of images for me to review. If needs FNA of other nodules will arrange (one 1.2 cm hypoechoic nodule per report) as would determine if needs surgery before planning for ZHOU  No urgency for treatment at this time  TSH in 3 months. Reviewed symptoms of hyperthyroidism

## 2022-08-25 ENCOUNTER — TELEPHONE (OUTPATIENT)
Dept: ENDOCRINOLOGY | Facility: CLINIC | Age: 57
End: 2022-08-25
Payer: COMMERCIAL

## 2022-08-25 DIAGNOSIS — E05.90 SUBCLINICAL HYPERTHYROIDISM: Primary | ICD-10-CM

## 2022-08-25 DIAGNOSIS — E04.1 THYROID NODULE: ICD-10-CM

## 2022-08-25 RX ORDER — METHIMAZOLE 5 MG/1
5 TABLET ORAL DAILY
Qty: 30 TABLET | Refills: 11 | Status: SHIPPED | OUTPATIENT
Start: 2022-08-25 | End: 2022-11-30

## 2022-08-25 NOTE — TELEPHONE ENCOUNTER
US reviewed and although nodule hot it has high risk features and is TIRADS 5. Will plan to normalize thyroid function with methimazole 5 mg daily and then obtain FNA with further management pending results    Labs 6 weeks  FNA end of December

## 2022-09-04 NOTE — PROGRESS NOTES
"Chief Complaint: Well Woman Exam     HPI:      Erica Swann is a 56 y.o.  who presents today for well woman exam.  LMP: Patient's last menstrual period was 2020 (approximate).  No issues, problems, or complaints. Specifically, patient denies abnormal vaginal bleeding, discharge, pelvic pain, urinary problems, or changes in appetite. Ms. Swann is currently sexually active with a single male partner. She declines STD screening today. Patient does not have regular monthly menses. Patient's last menstrual period was 2020 (approximate).  Menopausal complaints: none on HRT    Previous Pap: no abnormalities  (2020)  Previous Mammogram: 3/2/22: [facility] 2  Colonoscopy: 2021 polyp (10yr)      OB History          5    Para   1    Term   1            AB   4    Living   1         SAB   3    IAB        Ectopic   1    Multiple        Live Births   1                 GYN History  Age of Menarche:15  Age at first pregnancy:    Age at first live birth:42        ROS:     GENERAL: Denies unintentional weight gain or weight loss. Feeling well overall.   SKIN: Denies rash or lesions.   HEENT: Denies headaches, or vision changes.   CARDIOVASCULAR: Denies palpitations or chest pain.   RESPIRATORY: Denies shortness of breath or dyspnea on exertion.  BREASTS: Denies pain, lumps, or nipple discharge.   ABDOMEN: Denies abdominal pain, constipation, diarrhea, nausea, vomiting, change in appetite.  URINARY: Denies frequency, dysuria, hematuria.  NEUROLOGIC: Denies syncope or weakness.   PSYCHIATRIC: Denies depression, anxiety or mood swings.    Physical Exam:      PHYSICAL EXAM:  /70   Ht 5' 2" (1.575 m)   Wt 50.4 kg (111 lb 3.6 oz)   LMP 2020 (Approximate)   BMI 20.34 kg/m²   Body mass index is 20.34 kg/m².     APPEARANCE: Well nourished, well developed, in no acute distress.  PSYCH: Appropriate mood and affect.  SKIN: No acne or hirsutism  NECK: Neck symmetric without masses or " thyromegaly  NODES: No inguinal, axillary, or supraclavicular lymph node enlargement  ABDOMEN: Soft.  No tenderness or masses.    CARDIOVASCULAR: No edema of peripheral extremities  BREASTS: Symmetrical, no skin changes or visible lesions.  No palpable masses or nipple discharge bilaterally.  PELVIC: Normal external genitalia without lesions.  Normal hair distribution.  Adequate perineal body, normal urethral meatus.  Vagina moist and well rugated without lesions or discharge.  Cervix pink, without lesions, discharge or tenderness.  No significant cystocele or rectocele.  Bimanual exam shows uterus to be normal size, regular, mobile and nontender.  Adnexa without masses or tenderness.      Assessment/Plan:     Encounter for annual routine gynecological examination  Normal exam today. Pap smear up to date and normal. Mammogram up to date and normal. Osteoporosis prevention reviewed. No menopausal symptoms on HRT regimen and desires refill. Risks/benefits/use reviewed. Other health maintenance up to date per PCP.     Menopausal state  -     progesterone (PROMETRIUM) 100 MG capsule; Take 1 capsule (100 mg total) by mouth nightly.  Dispense: 90 capsule; Refill: 3  -     estradioL (ESTRACE) 1 MG tablet; Take 1 tablet (1 mg total) by mouth once daily.  Dispense: 90 tablet; Refill: 3    Hormone replacement therapy  -     progesterone (PROMETRIUM) 100 MG capsule; Take 1 capsule (100 mg total) by mouth nightly.  Dispense: 90 capsule; Refill: 3  -     estradioL (ESTRACE) 1 MG tablet; Take 1 tablet (1 mg total) by mouth once daily.  Dispense: 90 tablet; Refill: 3    RTC 1 year    Counseling:     Patient was counseled today on current ASCCP pap guidelines, the recommendation for yearly pelvic exams, healthy diet and exercise routines, breast self awareness and annual mammograms.She is to see her PCP for other health maintenance.     Use of the [employer] Patient Portal discussed and encouraged during today's visit.       Nely  MD Barney      As of April 1, 2021, the Cures Act has been passed nationally. This new law requires that all doctors progress notes, lab results, pathology reports and radiology reports be released IMMEDIATELY to the patient in the patient portal. That means that the results are released to you at the EXACT same time they are released to me. Therefore, with all of the patients that I have I am not able to reply to each patient exactly when the results come in. So there will be a delay from when you see the results to when I see them and have time to come up with a response to send you. Also I only see these results when I am on the computer at work. So if the results come in over the weekend or after 5 pm of a work day, I will not see them until the next business day. As you can tell, this is a challenge as a physician to give every patient the quick response they hope for and deserve. So please be patient! Thanks for understanding, Dr. Corley

## 2022-10-11 ENCOUNTER — LAB VISIT (OUTPATIENT)
Dept: LAB | Facility: HOSPITAL | Age: 57
End: 2022-10-11
Attending: INTERNAL MEDICINE
Payer: COMMERCIAL

## 2022-10-11 DIAGNOSIS — E05.90 HYPERTHYROIDISM: ICD-10-CM

## 2022-10-11 DIAGNOSIS — E05.90 SUBCLINICAL HYPERTHYROIDISM: ICD-10-CM

## 2022-10-11 PROCEDURE — 36415 COLL VENOUS BLD VENIPUNCTURE: CPT | Mod: PO | Performed by: INTERNAL MEDICINE

## 2022-10-11 PROCEDURE — 84443 ASSAY THYROID STIM HORMONE: CPT | Performed by: INTERNAL MEDICINE

## 2022-10-11 PROCEDURE — 80053 COMPREHEN METABOLIC PANEL: CPT | Performed by: INTERNAL MEDICINE

## 2022-10-12 ENCOUNTER — PATIENT MESSAGE (OUTPATIENT)
Dept: ENDOCRINOLOGY | Facility: CLINIC | Age: 57
End: 2022-10-12
Payer: COMMERCIAL

## 2022-10-12 DIAGNOSIS — E05.90 SUBCLINICAL HYPERTHYROIDISM: Primary | ICD-10-CM

## 2022-10-12 LAB
ALBUMIN SERPL BCP-MCNC: 3.8 G/DL (ref 3.5–5.2)
ALP SERPL-CCNC: 45 U/L (ref 55–135)
ALT SERPL W/O P-5'-P-CCNC: 19 U/L (ref 10–44)
ANION GAP SERPL CALC-SCNC: 12 MMOL/L (ref 8–16)
AST SERPL-CCNC: 21 U/L (ref 10–40)
BILIRUB SERPL-MCNC: 0.3 MG/DL (ref 0.1–1)
BUN SERPL-MCNC: 14 MG/DL (ref 6–20)
CALCIUM SERPL-MCNC: 9.1 MG/DL (ref 8.7–10.5)
CHLORIDE SERPL-SCNC: 104 MMOL/L (ref 95–110)
CO2 SERPL-SCNC: 26 MMOL/L (ref 23–29)
CREAT SERPL-MCNC: 0.8 MG/DL (ref 0.5–1.4)
EST. GFR  (NO RACE VARIABLE): >60 ML/MIN/1.73 M^2
GLUCOSE SERPL-MCNC: 86 MG/DL (ref 70–110)
POTASSIUM SERPL-SCNC: 3.8 MMOL/L (ref 3.5–5.1)
PROT SERPL-MCNC: 6.7 G/DL (ref 6–8.4)
SODIUM SERPL-SCNC: 142 MMOL/L (ref 136–145)
TSH SERPL DL<=0.005 MIU/L-ACNC: 0.86 UIU/ML (ref 0.4–4)
TSH SERPL DL<=0.005 MIU/L-ACNC: 0.86 UIU/ML (ref 0.4–4)

## 2022-10-18 ENCOUNTER — PATIENT MESSAGE (OUTPATIENT)
Dept: ENDOCRINOLOGY | Facility: CLINIC | Age: 57
End: 2022-10-18
Payer: COMMERCIAL

## 2022-10-18 DIAGNOSIS — E04.1 THYROID NODULE: Primary | ICD-10-CM

## 2022-10-24 ENCOUNTER — PATIENT MESSAGE (OUTPATIENT)
Dept: ENDOCRINOLOGY | Facility: CLINIC | Age: 57
End: 2022-10-24
Payer: COMMERCIAL

## 2022-11-03 ENCOUNTER — PATIENT MESSAGE (OUTPATIENT)
Dept: ENDOCRINOLOGY | Facility: CLINIC | Age: 57
End: 2022-11-03
Payer: COMMERCIAL

## 2022-11-03 ENCOUNTER — TELEPHONE (OUTPATIENT)
Dept: ENDOCRINOLOGY | Facility: CLINIC | Age: 57
End: 2022-11-03
Payer: COMMERCIAL

## 2022-11-03 NOTE — TELEPHONE ENCOUNTER
----- Message from Elly Carrera sent at 11/3/2022 11:14 AM CDT -----  Regarding: Returning Call  Contact: Pt @[phone]  Pt is requesting a call back Regarding Rescheduling Biopsy please call to discuss further.

## 2022-11-11 ENCOUNTER — PATIENT MESSAGE (OUTPATIENT)
Dept: OBSTETRICS AND GYNECOLOGY | Facility: CLINIC | Age: 57
End: 2022-11-11
Payer: COMMERCIAL

## 2022-11-11 DIAGNOSIS — N95.0 PMB (POSTMENOPAUSAL BLEEDING): Primary | ICD-10-CM

## 2022-11-14 ENCOUNTER — OFFICE VISIT (OUTPATIENT)
Dept: OBSTETRICS AND GYNECOLOGY | Facility: CLINIC | Age: 57
End: 2022-11-14
Payer: COMMERCIAL

## 2022-11-14 VITALS — BODY MASS INDEX: 20.34 KG/M2 | DIASTOLIC BLOOD PRESSURE: 70 MMHG | SYSTOLIC BLOOD PRESSURE: 112 MMHG | HEIGHT: 62 IN

## 2022-11-14 DIAGNOSIS — N95.0 PMB (POSTMENOPAUSAL BLEEDING): Primary | ICD-10-CM

## 2022-11-14 DIAGNOSIS — N76.0 BACTERIAL VAGINITIS: ICD-10-CM

## 2022-11-14 DIAGNOSIS — B96.89 BACTERIAL VAGINITIS: ICD-10-CM

## 2022-11-14 LAB
BILIRUB SERPL-MCNC: NORMAL MG/DL
BLOOD URINE, POC: NORMAL
CLARITY, POC UA: CLEAR
COLOR, POC UA: YELLOW
GLUCOSE UR QL STRIP: NORMAL
KETONES UR QL STRIP: NORMAL
LEUKOCYTE ESTERASE URINE, POC: NORMAL
NITRITE, POC UA: NORMAL
PH, POC UA: 6
PROTEIN, POC: NORMAL
SPECIFIC GRAVITY, POC UA: 1.01
UROBILINOGEN, POC UA: NORMAL

## 2022-11-14 PROCEDURE — 1159F PR MEDICATION LIST DOCUMENTED IN MEDICAL RECORD: ICD-10-PCS | Mod: CPTII,S$GLB,, | Performed by: OBSTETRICS & GYNECOLOGY

## 2022-11-14 PROCEDURE — 99999 PR PBB SHADOW E&M-EST. PATIENT-LVL III: CPT | Mod: PBBFAC,,, | Performed by: OBSTETRICS & GYNECOLOGY

## 2022-11-14 PROCEDURE — 99214 PR OFFICE/OUTPT VISIT, EST, LEVL IV, 30-39 MIN: ICD-10-PCS | Mod: S$GLB,,, | Performed by: OBSTETRICS & GYNECOLOGY

## 2022-11-14 PROCEDURE — 1160F RVW MEDS BY RX/DR IN RCRD: CPT | Mod: CPTII,S$GLB,, | Performed by: OBSTETRICS & GYNECOLOGY

## 2022-11-14 PROCEDURE — 99214 OFFICE O/P EST MOD 30 MIN: CPT | Mod: S$GLB,,, | Performed by: OBSTETRICS & GYNECOLOGY

## 2022-11-14 PROCEDURE — 3008F PR BODY MASS INDEX (BMI) DOCUMENTED: ICD-10-PCS | Mod: CPTII,S$GLB,, | Performed by: OBSTETRICS & GYNECOLOGY

## 2022-11-14 PROCEDURE — 1159F MED LIST DOCD IN RCRD: CPT | Mod: CPTII,S$GLB,, | Performed by: OBSTETRICS & GYNECOLOGY

## 2022-11-14 PROCEDURE — 1160F PR REVIEW ALL MEDS BY PRESCRIBER/CLIN PHARMACIST DOCUMENTED: ICD-10-PCS | Mod: CPTII,S$GLB,, | Performed by: OBSTETRICS & GYNECOLOGY

## 2022-11-14 PROCEDURE — 3008F BODY MASS INDEX DOCD: CPT | Mod: CPTII,S$GLB,, | Performed by: OBSTETRICS & GYNECOLOGY

## 2022-11-14 PROCEDURE — 3078F PR MOST RECENT DIASTOLIC BLOOD PRESSURE < 80 MM HG: ICD-10-PCS | Mod: CPTII,S$GLB,, | Performed by: OBSTETRICS & GYNECOLOGY

## 2022-11-14 PROCEDURE — 81002 POCT URINE DIPSTICK WITHOUT MICROSCOPE: ICD-10-PCS | Mod: S$GLB,,, | Performed by: OBSTETRICS & GYNECOLOGY

## 2022-11-14 PROCEDURE — 99999 PR PBB SHADOW E&M-EST. PATIENT-LVL III: ICD-10-PCS | Mod: PBBFAC,,, | Performed by: OBSTETRICS & GYNECOLOGY

## 2022-11-14 PROCEDURE — 3078F DIAST BP <80 MM HG: CPT | Mod: CPTII,S$GLB,, | Performed by: OBSTETRICS & GYNECOLOGY

## 2022-11-14 PROCEDURE — 81002 URINALYSIS NONAUTO W/O SCOPE: CPT | Mod: S$GLB,,, | Performed by: OBSTETRICS & GYNECOLOGY

## 2022-11-14 PROCEDURE — 3074F SYST BP LT 130 MM HG: CPT | Mod: CPTII,S$GLB,, | Performed by: OBSTETRICS & GYNECOLOGY

## 2022-11-14 PROCEDURE — 3074F PR MOST RECENT SYSTOLIC BLOOD PRESSURE < 130 MM HG: ICD-10-PCS | Mod: CPTII,S$GLB,, | Performed by: OBSTETRICS & GYNECOLOGY

## 2022-11-14 RX ORDER — METHIMAZOLE 5 MG/1
5 TABLET ORAL 3 TIMES DAILY
COMMUNITY
End: 2022-11-30

## 2022-11-14 RX ORDER — METRONIDAZOLE 7.5 MG/G
1 GEL VAGINAL NIGHTLY
Qty: 70 G | Refills: 0 | Status: SHIPPED | OUTPATIENT
Start: 2022-11-14 | End: 2022-11-19

## 2022-11-21 ENCOUNTER — HOSPITAL ENCOUNTER (OUTPATIENT)
Dept: ENDOCRINOLOGY | Facility: CLINIC | Age: 57
Discharge: HOME | End: 2022-11-21
Attending: INTERNAL MEDICINE
Payer: COMMERCIAL

## 2022-11-21 DIAGNOSIS — E04.1 THYROID NODULE: Primary | ICD-10-CM

## 2022-11-21 PROCEDURE — 88173 CYTOPATH EVAL FNA REPORT: CPT | Performed by: PATHOLOGY

## 2022-11-21 PROCEDURE — 88173 CYTOPATH EVAL FNA REPORT: CPT | Mod: 26,,, | Performed by: PATHOLOGY

## 2022-11-21 PROCEDURE — 88173 PR  INTERPRETATION OF FNA SMEAR: ICD-10-PCS | Mod: 26,,, | Performed by: PATHOLOGY

## 2022-11-21 PROCEDURE — 10005 US FINE NEEDLE ASPIRATION BIOPSY, FIRST LESION: ICD-10-PCS | Mod: S$GLB,,, | Performed by: INTERNAL MEDICINE

## 2022-11-21 PROCEDURE — 10005 FNA BX W/US GDN 1ST LES: CPT | Mod: S$GLB,,, | Performed by: INTERNAL MEDICINE

## 2022-11-25 LAB
FINAL PATHOLOGIC DIAGNOSIS: NORMAL
Lab: NORMAL

## 2022-11-30 ENCOUNTER — PATIENT MESSAGE (OUTPATIENT)
Dept: ENDOCRINOLOGY | Facility: CLINIC | Age: 57
End: 2022-11-30
Payer: COMMERCIAL

## 2022-11-30 DIAGNOSIS — E04.1 THYROID NODULE: Primary | ICD-10-CM

## 2022-11-30 DIAGNOSIS — E05.90 SUBCLINICAL HYPERTHYROIDISM: ICD-10-CM

## 2023-01-11 ENCOUNTER — LAB VISIT (OUTPATIENT)
Dept: LAB | Facility: HOSPITAL | Age: 58
End: 2023-01-11
Attending: INTERNAL MEDICINE
Payer: COMMERCIAL

## 2023-01-11 DIAGNOSIS — E05.90 SUBCLINICAL HYPERTHYROIDISM: ICD-10-CM

## 2023-01-11 PROCEDURE — 36415 COLL VENOUS BLD VENIPUNCTURE: CPT | Mod: PO | Performed by: INTERNAL MEDICINE

## 2023-01-11 PROCEDURE — 84443 ASSAY THYROID STIM HORMONE: CPT | Performed by: INTERNAL MEDICINE

## 2023-01-12 ENCOUNTER — PATIENT MESSAGE (OUTPATIENT)
Dept: ENDOCRINOLOGY | Facility: CLINIC | Age: 58
End: 2023-01-12

## 2023-01-12 DIAGNOSIS — E05.90 SUBCLINICAL HYPERTHYROIDISM: ICD-10-CM

## 2023-01-12 DIAGNOSIS — E04.1 THYROID NODULE: Primary | ICD-10-CM

## 2023-01-12 LAB — TSH SERPL DL<=0.005 MIU/L-ACNC: 0.5 UIU/ML (ref 0.4–4)
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
## 2023-04-19 ENCOUNTER — PATIENT MESSAGE (OUTPATIENT)
Dept: OBSTETRICS AND GYNECOLOGY | Facility: CLINIC | Age: 58
End: 2023-04-19

## 2023-04-19 DIAGNOSIS — Z12.31 OTHER SCREENING MAMMOGRAM: Primary | ICD-10-CM

## 2023-06-13 DIAGNOSIS — N95.1 MENOPAUSAL STATE: Primary | ICD-10-CM

## 2023-06-13 DIAGNOSIS — N95.2 VAGINAL ATROPHY: ICD-10-CM

## 2023-06-13 RX ORDER — ESTRADIOL 10 UG/1
INSERT VAGINAL
Qty: 24 TABLET | Refills: 0 | Status: SHIPPED | OUTPATIENT
Start: 2023-06-13

## 2023-06-29 ENCOUNTER — PATIENT MESSAGE (OUTPATIENT)
Dept: OBSTETRICS AND GYNECOLOGY | Facility: CLINIC | Age: 58
End: 2023-06-29
Payer: COMMERCIAL

## 2023-07-05 ENCOUNTER — PATIENT MESSAGE (OUTPATIENT)
Dept: OBSTETRICS AND GYNECOLOGY | Facility: CLINIC | Age: 58
End: 2023-07-05
Payer: COMMERCIAL

## 2023-07-07 ENCOUNTER — LAB VISIT (OUTPATIENT)
Dept: LAB | Facility: HOSPITAL | Age: 58
End: 2023-07-07
Attending: INTERNAL MEDICINE
Payer: COMMERCIAL

## 2023-07-07 DIAGNOSIS — E05.90 SUBCLINICAL HYPERTHYROIDISM: ICD-10-CM

## 2023-07-07 PROCEDURE — 84439 ASSAY OF FREE THYROXINE: CPT | Performed by: INTERNAL MEDICINE

## 2023-07-07 PROCEDURE — 84443 ASSAY THYROID STIM HORMONE: CPT | Performed by: INTERNAL MEDICINE

## 2023-07-07 PROCEDURE — 36415 COLL VENOUS BLD VENIPUNCTURE: CPT | Mod: PO | Performed by: INTERNAL MEDICINE

## 2023-07-08 LAB
T4 FREE SERPL-MCNC: 0.86 NG/DL (ref 0.71–1.51)
TSH SERPL DL<=0.005 MIU/L-ACNC: 0.27 UIU/ML (ref 0.4–4)

## 2023-07-10 ENCOUNTER — PATIENT MESSAGE (OUTPATIENT)
Dept: ENDOCRINOLOGY | Facility: CLINIC | Age: 58
End: 2023-07-10
Payer: COMMERCIAL

## 2023-07-10 DIAGNOSIS — E04.1 THYROID NODULE: Primary | ICD-10-CM

## 2023-07-10 DIAGNOSIS — E05.90 SUBCLINICAL HYPERTHYROIDISM: ICD-10-CM

## 2023-07-17 ENCOUNTER — OFFICE VISIT (OUTPATIENT)
Dept: OBSTETRICS AND GYNECOLOGY | Facility: CLINIC | Age: 58
End: 2023-07-17
Payer: COMMERCIAL

## 2023-07-17 VITALS
BODY MASS INDEX: 20.88 KG/M2 | SYSTOLIC BLOOD PRESSURE: 112 MMHG | HEIGHT: 62 IN | DIASTOLIC BLOOD PRESSURE: 70 MMHG | WEIGHT: 113.44 LBS

## 2023-07-17 DIAGNOSIS — N95.1 MENOPAUSAL STATE: ICD-10-CM

## 2023-07-17 DIAGNOSIS — Z79.890 HORMONE REPLACEMENT THERAPY: ICD-10-CM

## 2023-07-17 DIAGNOSIS — Z01.419 ENCOUNTER FOR ANNUAL ROUTINE GYNECOLOGICAL EXAMINATION: Primary | ICD-10-CM

## 2023-07-17 DIAGNOSIS — Z12.4 ENCOUNTER FOR PAPANICOLAOU SMEAR FOR CERVICAL CANCER SCREENING: ICD-10-CM

## 2023-07-17 DIAGNOSIS — Z11.51 ENCOUNTER FOR SCREENING FOR HUMAN PAPILLOMAVIRUS (HPV): ICD-10-CM

## 2023-07-17 PROCEDURE — 3074F SYST BP LT 130 MM HG: CPT | Mod: CPTII,S$GLB,, | Performed by: OBSTETRICS & GYNECOLOGY

## 2023-07-17 PROCEDURE — 99396 PR PREVENTIVE VISIT,EST,40-64: ICD-10-PCS | Mod: S$GLB,,, | Performed by: OBSTETRICS & GYNECOLOGY

## 2023-07-17 PROCEDURE — 3074F PR MOST RECENT SYSTOLIC BLOOD PRESSURE < 130 MM HG: ICD-10-PCS | Mod: CPTII,S$GLB,, | Performed by: OBSTETRICS & GYNECOLOGY

## 2023-07-17 PROCEDURE — 1159F PR MEDICATION LIST DOCUMENTED IN MEDICAL RECORD: ICD-10-PCS | Mod: CPTII,S$GLB,, | Performed by: OBSTETRICS & GYNECOLOGY

## 2023-07-17 PROCEDURE — 1160F RVW MEDS BY RX/DR IN RCRD: CPT | Mod: CPTII,S$GLB,, | Performed by: OBSTETRICS & GYNECOLOGY

## 2023-07-17 PROCEDURE — 88175 CYTOPATH C/V AUTO FLUID REDO: CPT | Performed by: OBSTETRICS & GYNECOLOGY

## 2023-07-17 PROCEDURE — 3008F BODY MASS INDEX DOCD: CPT | Mod: CPTII,S$GLB,, | Performed by: OBSTETRICS & GYNECOLOGY

## 2023-07-17 PROCEDURE — 87624 HPV HI-RISK TYP POOLED RSLT: CPT | Performed by: OBSTETRICS & GYNECOLOGY

## 2023-07-17 PROCEDURE — 1160F PR REVIEW ALL MEDS BY PRESCRIBER/CLIN PHARMACIST DOCUMENTED: ICD-10-PCS | Mod: CPTII,S$GLB,, | Performed by: OBSTETRICS & GYNECOLOGY

## 2023-07-17 PROCEDURE — 3078F PR MOST RECENT DIASTOLIC BLOOD PRESSURE < 80 MM HG: ICD-10-PCS | Mod: CPTII,S$GLB,, | Performed by: OBSTETRICS & GYNECOLOGY

## 2023-07-17 PROCEDURE — 99999 PR PBB SHADOW E&M-EST. PATIENT-LVL III: ICD-10-PCS | Mod: PBBFAC,,, | Performed by: OBSTETRICS & GYNECOLOGY

## 2023-07-17 PROCEDURE — 3008F PR BODY MASS INDEX (BMI) DOCUMENTED: ICD-10-PCS | Mod: CPTII,S$GLB,, | Performed by: OBSTETRICS & GYNECOLOGY

## 2023-07-17 PROCEDURE — 99396 PREV VISIT EST AGE 40-64: CPT | Mod: S$GLB,,, | Performed by: OBSTETRICS & GYNECOLOGY

## 2023-07-17 PROCEDURE — 99999 PR PBB SHADOW E&M-EST. PATIENT-LVL III: CPT | Mod: PBBFAC,,, | Performed by: OBSTETRICS & GYNECOLOGY

## 2023-07-17 PROCEDURE — 1159F MED LIST DOCD IN RCRD: CPT | Mod: CPTII,S$GLB,, | Performed by: OBSTETRICS & GYNECOLOGY

## 2023-07-17 PROCEDURE — 3078F DIAST BP <80 MM HG: CPT | Mod: CPTII,S$GLB,, | Performed by: OBSTETRICS & GYNECOLOGY

## 2023-07-17 RX ORDER — PROGESTERONE 100 MG/1
100 CAPSULE ORAL NIGHTLY
Qty: 90 CAPSULE | Refills: 3 | Status: SHIPPED | OUTPATIENT
Start: 2023-07-17 | End: 2024-07-16

## 2023-07-17 RX ORDER — ESTRADIOL 1 MG/1
1 TABLET ORAL DAILY
Qty: 90 TABLET | Refills: 3 | Status: SHIPPED | OUTPATIENT
Start: 2023-07-17 | End: 2024-07-16

## 2023-07-17 NOTE — PROGRESS NOTES
Chief Complaint: Well Woman Exam     HPI:      Erica Swann is a 57 y.o.  who presents today for well woman exam.  LMP: Patient's last menstrual period was 2020 (approximate).  No issues, problems, or complaints. Specifically, patient denies abnormal vaginal bleeding, discharge, pelvic pain, urinary problems, or changes in appetite. Ms. Swann is currently sexually active with a single male partner. She declines STD screening today. Patient does not have regular monthly menses. Patient's last menstrual period was 2020 (approximate). Menopausal complaints: none on Estrogen and Progesterone now. Work up for PMB 2022 with normal EMS and BV. She reports happened one additional time about 3-4 months ago where she had light pink on her pad daily (minimal amount) for about 7 days. Not happened again since that time. She has not been using the Vagifem consistently due to cost and working on finding a place to get cheaper.    Previous Pap: no abnormalities/HPV negative  (2023)  Previous Mammogram: 23 [facility]: BIRAD 2 T-C score: 5.9%  Most Recent Dexa: 22 per Endo with osteopenia of hip  Colonoscopy: 2021 with benign polyp; 10 year follow up recommended      OB History          5    Para   1    Term   1            AB   4    Living   1         SAB   3    IAB        Ectopic   1    Multiple        Live Births   1                 GYN History  Age of Menarche:15  Age at first pregnancy:    Age at first live birth:42        ROS:     GENERAL: Denies unintentional weight gain or weight loss. Feeling well overall.   SKIN: Denies rash or lesions.   HEENT: Denies headaches, or vision changes.   CARDIOVASCULAR: Denies palpitations or chest pain.   RESPIRATORY: Denies shortness of breath or dyspnea on exertion.  BREASTS: Denies pain, lumps, or nipple discharge.   ABDOMEN: Denies abdominal pain, constipation, diarrhea, nausea, vomiting, change in appetite.  URINARY: Denies frequency,  "dysuria, hematuria.  NEUROLOGIC: Denies syncope or weakness.   PSYCHIATRIC: Denies depression, anxiety or mood swings.    Physical Exam:      PHYSICAL EXAM:  /70   Ht 5' 2" (1.575 m)   Wt 51.5 kg (113 lb 6.8 oz)   LMP 07/21/2020 (Approximate)   BMI 20.75 kg/m²   Body mass index is 20.75 kg/m².     APPEARANCE: Well nourished, well developed, in no acute distress.  PSYCH: Appropriate mood and affect.  SKIN: No acne or hirsutism  NECK: Neck symmetric without masses or thyromegaly  NODES: No inguinal, axillary, or supraclavicular lymph node enlargement  ABDOMEN: Soft.  No tenderness or masses.    CARDIOVASCULAR: No edema of peripheral extremities  BREASTS: Symmetrical, no skin changes or visible lesions.  No palpable masses or nipple discharge bilaterally.  PELVIC: Normal external genitalia without lesions.  Normal hair distribution.  Adequate perineal body, normal urethral meatus.  Vagina atrophic without lesions or discharge.  Cervix pink, without lesions, discharge or tenderness.  No significant cystocele or rectocele.  Bimanual exam shows uterus to be normal size, regular, mobile and nontender.  Adnexa without masses or tenderness.      Assessment/Plan:     Encounter for annual routine gynecological examination  Normal exam today. Pap smear with HPV done today. Mammogram up to date and normal. DEXA per Endocrine with stable thyroid nodule/goiter. Osteoporosis prevention reviewed. No menopausal complaints other than cost to use vaginal estrogen consistently. Counseled on Good Rx and checking local pharmacies for cash price. Recommend further work up if notes any further possible bleeding. Other health maintenance up to date per PCP.     Menopausal state  -     progesterone (PROMETRIUM) 100 MG capsule; Take 1 capsule (100 mg total) by mouth nightly.  Dispense: 90 capsule; Refill: 3  -     estradioL (ESTRACE) 1 MG tablet; Take 1 tablet (1 mg total) by mouth once daily.  Dispense: 90 tablet; Refill: " 3    Hormone replacement therapy  -     progesterone (PROMETRIUM) 100 MG capsule; Take 1 capsule (100 mg total) by mouth nightly.  Dispense: 90 capsule; Refill: 3  -     estradioL (ESTRACE) 1 MG tablet; Take 1 tablet (1 mg total) by mouth once daily.  Dispense: 90 tablet; Refill: 3    Encounter for Papanicolaou smear for cervical cancer screening  -     Liquid-Based Pap Smear, Screening    Encounter for screening for human papillomavirus (HPV)  -     HPV High Risk Genotypes, PCR      RTC 1 year    Counseling:     Patient was counseled today on current ASCCP pap guidelines, the recommendation for yearly pelvic exams, healthy diet and exercise routines, breast self awareness and annual mammograms.She is to see her PCP for other health maintenance.     Use of the [facility] Patient Portal discussed and encouraged during today's visit.       Nely Corley MD      As of April 1, 2021, the Cures Act has been passed nationally. This new law requires that all doctors progress notes, lab results, pathology reports and radiology reports be released IMMEDIATELY to the patient in the patient portal. That means that the results are released to you at the EXACT same time they are released to me. Therefore, with all of the patients that I have I am not able to reply to each patient exactly when the results come in. So there will be a delay from when you see the results to when I see them and have time to come up with a response to send you. Also I only see these results when I am on the computer at work. So if the results come in over the weekend or after 5 pm of a work day, I will not see them until the next business day. As you can tell, this is a challenge as a physician to give every patient the quick response they hope for and deserve. So please be patient! Thanks for understanding, Dr. Corley

## 2023-07-24 LAB
FINAL PATHOLOGIC DIAGNOSIS: NORMAL
Lab: NORMAL

## 2023-10-01 ENCOUNTER — PATIENT MESSAGE (OUTPATIENT)
Dept: ENDOCRINOLOGY | Facility: CLINIC | Age: 58
End: 2023-10-01
Payer: COMMERCIAL

## 2023-10-18 ENCOUNTER — PATIENT MESSAGE (OUTPATIENT)
Dept: ENDOCRINOLOGY | Facility: CLINIC | Age: 58
End: 2023-10-18
Payer: COMMERCIAL

## 2023-10-18 DIAGNOSIS — E05.90 SUBCLINICAL HYPERTHYROIDISM: Primary | ICD-10-CM

## 2023-11-06 ENCOUNTER — HOSPITAL ENCOUNTER (OUTPATIENT)
Dept: RADIOLOGY | Facility: HOSPITAL | Age: 58
Discharge: HOME | End: 2023-11-06
Attending: INTERNAL MEDICINE
Payer: COMMERCIAL

## 2023-11-06 DIAGNOSIS — E05.90 SUBCLINICAL HYPERTHYROIDISM: ICD-10-CM

## 2023-11-06 DIAGNOSIS — E04.1 THYROID NODULE: ICD-10-CM

## 2023-11-06 PROCEDURE — 76536 US EXAM OF HEAD AND NECK: CPT | Mod: 26,,, | Performed by: INTERNAL MEDICINE

## 2023-11-06 PROCEDURE — 76536 US EXAM OF HEAD AND NECK: CPT | Mod: TC

## 2023-11-06 PROCEDURE — 76536 US SOFT TISSUE HEAD NECK THYROID: ICD-10-PCS | Mod: 26,,, | Performed by: INTERNAL MEDICINE

## 2023-11-22 ENCOUNTER — OFFICE VISIT (OUTPATIENT)
Dept: ENDOCRINOLOGY | Facility: CLINIC | Age: 58
End: 2023-11-22
Payer: COMMERCIAL

## 2023-11-22 DIAGNOSIS — M85.80 OSTEOPENIA, UNSPECIFIED LOCATION: ICD-10-CM

## 2023-11-22 DIAGNOSIS — E05.90 SUBCLINICAL HYPERTHYROIDISM: Primary | ICD-10-CM

## 2023-11-22 DIAGNOSIS — E04.1 THYROID NODULE: ICD-10-CM

## 2023-11-22 PROCEDURE — 1159F PR MEDICATION LIST DOCUMENTED IN MEDICAL RECORD: ICD-10-PCS | Mod: CPTII,95,, | Performed by: INTERNAL MEDICINE

## 2023-11-22 PROCEDURE — 1160F PR REVIEW ALL MEDS BY PRESCRIBER/CLIN PHARMACIST DOCUMENTED: ICD-10-PCS | Mod: CPTII,95,, | Performed by: INTERNAL MEDICINE

## 2023-11-22 PROCEDURE — 1160F RVW MEDS BY RX/DR IN RCRD: CPT | Mod: CPTII,95,, | Performed by: INTERNAL MEDICINE

## 2023-11-22 PROCEDURE — 99214 OFFICE O/P EST MOD 30 MIN: CPT | Mod: 95,,, | Performed by: INTERNAL MEDICINE

## 2023-11-22 PROCEDURE — 1159F MED LIST DOCD IN RCRD: CPT | Mod: CPTII,95,, | Performed by: INTERNAL MEDICINE

## 2023-11-22 PROCEDURE — 99214 PR OFFICE/OUTPT VISIT, EST, LEVL IV, 30-39 MIN: ICD-10-PCS | Mod: 95,,, | Performed by: INTERNAL MEDICINE

## 2023-11-22 RX ORDER — METHIMAZOLE 5 MG/1
5 TABLET ORAL DAILY
Qty: 90 TABLET | Refills: 3 | Status: SHIPPED | OUTPATIENT
Start: 2023-11-22

## 2023-11-22 NOTE — ASSESSMENT & PLAN NOTE
No pressing need for treatment but with some symptoms which could be due to hyperthyroidism  Discussed options and with toxic nodule recommend definitive treatment at some point. She is interested in [facility] but will plan for this after retires  Given current symptoms will start low dose methimazole 5 mg daily to see if these improve  TSH 6 weeks

## 2023-11-22 NOTE — PROGRESS NOTES
Erica Swann is a 57 y.o. 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, thyroid nodule    The patient location is: work in [place]  The chief complaint leading to consultation is:   Chief Complaint   Patient presents with    Diabetes       Visit type: audiovisual    Face to Face time with patient: 24 minutes  35 minutes of total time spent on the encounter, which includes face to face time and non-face to face time preparing to see the patient (eg, review of tests), Obtaining and/or reviewing separately obtained history, Documenting clinical information in the electronic or other health record, Independently interpreting results (not separately reported) and communicating results to the patient/family/caregiver, or Care coordination (not separately reported).    Each patient to whom he or she provides medical services by telemedicine is:  (1) informed of the relationship between the physician and patient and the respective role of any other health care provider with respect to management of the patient; and (2) notified that he or she may decline to receive medical services by telemedicine and may withdraw from such care at any time.      History of Present Illness  Thyroid nodule  Subclinical hyperthyroidism  Found on exam by internist    Had US at [facility] with left lobe nodule measuring 2.6 x 1.1 x 1.2 cm- oval circumscribed hypoechoic solid nodule with heterogeneous parenchyma and increased vascularity  TIRADS 5 and FNA recommended    Labs with subclinical hyperthyroidism as below beginning in June 2022. Denies prior history of thyroid disease and previous TSHs all normal    Uptake and scan demonstrated that this was a toxic nodule however given high risk features we obtained FNA which was benign    FNA 11/2022 of left lobe nodule benign   Repeat US 11/2023 stable    We treated briefly with methimazole to normalize thyroid function prior to FNA but once this was complete stopped  as she otherwise did not have indication for treatment  Her only symptoms right now is splitting nails which are very bothersome  Some fatigue as well but feels this was more due to family stressors a few months ago    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     Father maybe with nodule    Thyroid uptake and scan 7/2022:  FINDINGS:  The 24 hour uptake is normal at 14.8 % (normal 10-30%).   Thyroid gland is normal shape and size.  There is focally increased uptake at the pole of the left thyroid lobe with relative suppression of uptake in the remainder of the gland.     Impression:   1. Normal 24 hour radioiodine uptake by the thyroid.  2. 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     There is a 1.0 x 0.3 x 0.6 cm solid nodule that is hypoechoic, wider than tall, with ill-defined margins, and without punctate echogenic foci (TI-RADS 4).     There is a 0.8 x 0.3 x 0.6 cm solid nodule with hypoechoic components, which is wider than tall, with ill-defined margins, and without punctate echogenic foci (TI-RADS 4).     Left lobe: Left lobe of the thyroid measures 4.6 x 1.4 x 2.0 cm.     There is a 2.6 x 1.1 x 1.2 cm solid hypoechoic nodule that is wider than tall, with smooth margins, and with punctate echogenic foci.  This was previously biopsied reported as consistent with a benign follicular nodule.     There is a 1.2 x 1.3 x 0.8 cm solid hypoechoic nodule that is wider than tall, with smooth margins, without punctate echogenic foci (TI-RADS 4).     Isthmus: The isthmus measures 0.2 cm.     Cervical lymph nodes demonstrate no significant  abnormality.     Impression:  Similar ultrasound demonstrating bilateral thyroid nodules, the largest in the left lower lobe with previous biopsy and reported as a benign follicular nodule.  Additional bilateral nodules which meet ACR criteria for follow-up in 1 years time.    On HRT prescribed by gyn  DXA 7/2022 with osteopenia      Current Outpatient Medications:     calcium carbonate/vitamin D3 (CALCIUM WITH VITAMIN D ORAL), Take by mouth., Disp: , Rfl:     EScitalopram oxalate (LEXAPRO) 5 MG Tab, , Disp: , Rfl:     estradioL (ESTRACE) 1 MG tablet, Take 1 tablet (1 mg total) by mouth once daily., Disp: 90 tablet, Rfl: 3    estradioL (VAGIFEM) 10 mcg Tab, INSERT 1 TABLET(10 MCG) VAGINALLY 2 TIMES A WEEK (Patient not taking: Reported on 7/17/2023), Disp: 24 tablet, Rfl: 0    gabapentin (NEURONTIN) 100 MG capsule, Take by mouth., Disp: , Rfl:     loratadine (CLARITIN) 10 mg tablet, Take 10 mg by mouth once daily., Disp: , Rfl:     methIMAzole (TAPAZOLE) 5 MG Tab, Take 1 tablet (5 mg total) by mouth once daily., Disp: 90 tablet, Rfl: 3    progesterone (PROMETRIUM) 100 MG capsule, Take 1 capsule (100 mg total) by mouth nightly., Disp: 90 capsule, Rfl: 3    tretinoin (RETIN-A) 0.025 % cream, , Disp: , Rfl:     triamcinolone acetonide (NASACORT NASL), by Nasal route., Disp: , Rfl:     ROS as above    Objective:     There were no vitals filed for this visit.  Wt Readings from Last 3 Encounters:   07/17/23 51.5 kg (113 lb 6.8 oz)   07/28/22 50.4 kg (111 lb 3.6 oz)   07/11/22 50.5 kg (111 lb 5.3 oz)     There is no height or weight on file to calculate BMI.    Labs    Chemistry        Component Value Date/Time     10/11/2022 1405    K 3.8 10/11/2022 1405     10/11/2022 1405    CO2 26 10/11/2022 1405    BUN 14 10/11/2022 1405    CREATININE 0.8 10/11/2022 1405    GLU 86 10/11/2022 1405        Component Value Date/Time    CALCIUM 9.1 10/11/2022 1405    ALKPHOS 45 (L) 10/11/2022 1405    AST 21 10/11/2022 1405     ALT 19 10/11/2022 1405    BILITOT 0.3 10/11/2022 1405              Assessment and Plan     Thyroid nodule  S/p benign FNA of left lobe 2.6 cm nodule  US with few small nodules not meeting FNA criteria but will continue to monitor with repeat US 11/2024    Subclinical hyperthyroidism  No pressing need for treatment but with some symptoms which could be due to hyperthyroidism  Discussed options and with toxic nodule recommend definitive treatment at some point. She is interested in [facility] but will plan for this after retires  Given current symptoms will start low dose methimazole 5 mg daily to see if these improve  TSH 6 weeks    Osteopenia  On HRT  Repeat DXA 2024        RTC summer 2024 to discuss ZHOU ablation        Erin Morales MD

## 2023-11-22 NOTE — ASSESSMENT & PLAN NOTE
S/p benign FNA of left lobe 2.6 cm nodule  US with few small nodules not meeting FNA criteria but will continue to monitor with repeat US 11/2024

## 2024-01-08 ENCOUNTER — LAB VISIT (OUTPATIENT)
Dept: LAB | Facility: HOSPITAL | Age: 59
End: 2024-01-08
Attending: INTERNAL MEDICINE
Payer: COMMERCIAL

## 2024-01-08 DIAGNOSIS — E05.90 SUBCLINICAL HYPERTHYROIDISM: ICD-10-CM

## 2024-01-08 PROCEDURE — 36415 COLL VENOUS BLD VENIPUNCTURE: CPT | Performed by: INTERNAL MEDICINE

## 2024-01-08 PROCEDURE — 84443 ASSAY THYROID STIM HORMONE: CPT | Performed by: INTERNAL MEDICINE

## 2024-01-09 ENCOUNTER — PATIENT MESSAGE (OUTPATIENT)
Dept: ENDOCRINOLOGY | Facility: CLINIC | Age: 59
End: 2024-01-09
Payer: COMMERCIAL

## 2024-01-09 DIAGNOSIS — E05.90 SUBCLINICAL HYPERTHYROIDISM: Primary | ICD-10-CM

## 2024-01-09 LAB — TSH SERPL DL<=0.005 MIU/L-ACNC: 1.06 UIU/ML (ref 0.4–4)

## 2024-02-27 ENCOUNTER — PATIENT MESSAGE (OUTPATIENT)
Dept: ENDOCRINOLOGY | Facility: CLINIC | Age: 59
End: 2024-02-27
Payer: COMMERCIAL

## 2024-06-19 ENCOUNTER — TELEPHONE (OUTPATIENT)
Dept: OBSTETRICS AND GYNECOLOGY | Facility: CLINIC | Age: 59
End: 2024-06-19
Payer: COMMERCIAL

## 2024-06-19 DIAGNOSIS — Z12.31 SCREENING MAMMOGRAM FOR BREAST CANCER: Primary | ICD-10-CM

## 2024-06-26 ENCOUNTER — OFFICE VISIT (OUTPATIENT)
Dept: ENDOCRINOLOGY | Facility: CLINIC | Age: 59
End: 2024-06-26
Payer: COMMERCIAL

## 2024-06-26 ENCOUNTER — LAB VISIT (OUTPATIENT)
Dept: LAB | Facility: HOSPITAL | Age: 59
End: 2024-06-26
Attending: INTERNAL MEDICINE
Payer: COMMERCIAL

## 2024-06-26 VITALS
BODY MASS INDEX: 21.14 KG/M2 | HEART RATE: 75 BPM | OXYGEN SATURATION: 98 % | DIASTOLIC BLOOD PRESSURE: 84 MMHG | SYSTOLIC BLOOD PRESSURE: 122 MMHG | HEIGHT: 62 IN | WEIGHT: 114.88 LBS

## 2024-06-26 DIAGNOSIS — E05.90 SUBCLINICAL HYPERTHYROIDISM: ICD-10-CM

## 2024-06-26 DIAGNOSIS — E89.40 ASYMPTOMATIC POSTSURGICAL MENOPAUSE: ICD-10-CM

## 2024-06-26 DIAGNOSIS — E04.1 THYROID NODULE: ICD-10-CM

## 2024-06-26 DIAGNOSIS — E89.40 ASYMPTOMATIC POSTSURGICAL MENOPAUSE: Primary | ICD-10-CM

## 2024-06-26 DIAGNOSIS — M85.80 OSTEOPENIA, UNSPECIFIED LOCATION: ICD-10-CM

## 2024-06-26 LAB
25(OH)D3+25(OH)D2 SERPL-MCNC: 32 NG/ML (ref 30–96)
ALBUMIN SERPL BCP-MCNC: 3.6 G/DL (ref 3.5–5.2)
ANION GAP SERPL CALC-SCNC: 9 MMOL/L (ref 8–16)
BUN SERPL-MCNC: 13 MG/DL (ref 6–20)
CALCIUM SERPL-MCNC: 9.2 MG/DL (ref 8.7–10.5)
CHLORIDE SERPL-SCNC: 104 MMOL/L (ref 95–110)
CO2 SERPL-SCNC: 27 MMOL/L (ref 23–29)
CREAT SERPL-MCNC: 0.9 MG/DL (ref 0.5–1.4)
EST. GFR  (NO RACE VARIABLE): >60 ML/MIN/1.73 M^2
GLUCOSE SERPL-MCNC: 92 MG/DL (ref 70–110)
PHOSPHATE SERPL-MCNC: 3.5 MG/DL (ref 2.7–4.5)
POTASSIUM SERPL-SCNC: 3.8 MMOL/L (ref 3.5–5.1)
SODIUM SERPL-SCNC: 140 MMOL/L (ref 136–145)
T4 FREE SERPL-MCNC: 0.82 NG/DL (ref 0.71–1.51)
TSH SERPL DL<=0.005 MIU/L-ACNC: 0.18 UIU/ML (ref 0.4–4)

## 2024-06-26 PROCEDURE — 84439 ASSAY OF FREE THYROXINE: CPT | Performed by: INTERNAL MEDICINE

## 2024-06-26 PROCEDURE — 1160F RVW MEDS BY RX/DR IN RCRD: CPT | Mod: CPTII,S$GLB,, | Performed by: INTERNAL MEDICINE

## 2024-06-26 PROCEDURE — 99214 OFFICE O/P EST MOD 30 MIN: CPT | Mod: S$GLB,,, | Performed by: INTERNAL MEDICINE

## 2024-06-26 PROCEDURE — 80069 RENAL FUNCTION PANEL: CPT | Performed by: INTERNAL MEDICINE

## 2024-06-26 PROCEDURE — 99999 PR PBB SHADOW E&M-EST. PATIENT-LVL IV: CPT | Mod: PBBFAC,,, | Performed by: INTERNAL MEDICINE

## 2024-06-26 PROCEDURE — 1159F MED LIST DOCD IN RCRD: CPT | Mod: CPTII,S$GLB,, | Performed by: INTERNAL MEDICINE

## 2024-06-26 PROCEDURE — G2211 COMPLEX E/M VISIT ADD ON: HCPCS | Mod: S$GLB,,, | Performed by: INTERNAL MEDICINE

## 2024-06-26 PROCEDURE — 3074F SYST BP LT 130 MM HG: CPT | Mod: CPTII,S$GLB,, | Performed by: INTERNAL MEDICINE

## 2024-06-26 PROCEDURE — 84443 ASSAY THYROID STIM HORMONE: CPT | Performed by: INTERNAL MEDICINE

## 2024-06-26 PROCEDURE — 3008F BODY MASS INDEX DOCD: CPT | Mod: CPTII,S$GLB,, | Performed by: INTERNAL MEDICINE

## 2024-06-26 PROCEDURE — 3079F DIAST BP 80-89 MM HG: CPT | Mod: CPTII,S$GLB,, | Performed by: INTERNAL MEDICINE

## 2024-06-26 PROCEDURE — 36415 COLL VENOUS BLD VENIPUNCTURE: CPT | Performed by: INTERNAL MEDICINE

## 2024-06-26 PROCEDURE — 82306 VITAMIN D 25 HYDROXY: CPT | Performed by: INTERNAL MEDICINE

## 2024-06-26 NOTE — PROGRESS NOTES
Erica Swann is a 58 y.o. 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 Denies prior history of thyroid disease and previous TSHs all normal    Uptake and scan demonstrated that this was a toxic nodule however given high risk features we obtained FNA which was benign    FNA 11/2022 of left lobe nodule benign   Repeat US 11/2023 stable    We treated briefly with methimazole to normalize thyroid function prior to FNA but once this was complete stopped as she otherwise did not have indication for treatment  At last visit retried methimazole but then developed joint pain so now off  Does not feel any different on or off treatment    We have discussed option of ZHOU for definitive treatment    Risk Factors for Thyroid Cancer:  Hx of External Beam Radiation:denies  Family Hx of Thyroid Cancer: denies    Denies rapid neck enlargement, difficulty swallowing, SOB, or hoarseness.     Father maybe with nodule    Thyroid uptake and scan 7/2022:  FINDINGS:  The 24 hour uptake is normal at 14.8 % (normal 10-30%).   Thyroid gland is normal shape and size.  There is focally increased uptake at the pole of the left thyroid lobe with relative suppression of uptake in the remainder of the gland.     Impression:   1. Normal 24 hour radioiodine uptake by the thyroid.  2. 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    Lab Results   Component Value Date    TSH 0.185 (L) 06/26/2024     Last US 11/2023:  FINDINGS:  Right lobe: Right lobe of the thyroid measures 4.7 x 1.4 x 1.3 cm.     There  is a 1.0 x 0.3 x 0.6 cm solid nodule that is hypoechoic, wider than tall, with ill-defined margins, and without punctate echogenic foci (TI-RADS 4).     There is a 0.8 x 0.3 x 0.6 cm solid nodule with hypoechoic components, which is wider than tall, with ill-defined margins, and without punctate echogenic foci (TI-RADS 4).     Left lobe: Left lobe of the thyroid measures 4.6 x 1.4 x 2.0 cm.     There is a 2.6 x 1.1 x 1.2 cm solid hypoechoic nodule that is wider than tall, with smooth margins, and with punctate echogenic foci.  This was previously biopsied reported as consistent with a benign follicular nodule.     There is a 1.2 x 1.3 x 0.8 cm solid hypoechoic nodule that is wider than tall, with smooth margins, without punctate echogenic foci (TI-RADS 4).     Isthmus: The isthmus measures 0.2 cm.     Cervical lymph nodes demonstrate no significant abnormality.     Impression:  Similar ultrasound demonstrating bilateral thyroid nodules, the largest in the left lower lobe with previous biopsy and reported as a benign follicular nodule.  Additional bilateral nodules which meet ACR criteria for follow-up in 1 years time.    On HRT prescribed by gyn  DXA 7/2022 with osteopenia      Current Outpatient Medications:     calcium carbonate/vitamin D3 (CALCIUM WITH VITAMIN D ORAL), Take by mouth., Disp: , Rfl:     EScitalopram oxalate (LEXAPRO) 5 MG Tab, , Disp: , Rfl:     estradioL (ESTRACE) 1 MG tablet, Take 1 tablet (1 mg total) by mouth once daily., Disp: 90 tablet, Rfl: 3    gabapentin (NEURONTIN) 100 MG capsule, Take by mouth., Disp: , Rfl:     loratadine (CLARITIN) 10 mg tablet, Take 10 mg by mouth once daily., Disp: , Rfl:     progesterone (PROMETRIUM) 100 MG capsule, Take 1 capsule (100 mg total) by mouth nightly., Disp: 90 capsule, Rfl: 3    tretinoin (RETIN-A) 0.025 % cream, , Disp: , Rfl:     triamcinolone acetonide (NASACORT NASL), by Nasal route., Disp: , Rfl:     ROS as above    Objective:     Vitals:     06/26/24 1434   BP: 122/84   Pulse: 75     Wt Readings from Last 3 Encounters:   06/26/24 1434 52.1 kg (114 lb 13.8 oz)   07/17/23 0938 51.5 kg (113 lb 6.8 oz)   07/28/22 1107 50.4 kg (111 lb 3.6 oz)         Body mass index is 21.01 kg/m².    Labs    Chemistry        Component Value Date/Time     06/26/2024 1515    K 3.8 06/26/2024 1515     06/26/2024 1515    CO2 27 06/26/2024 1515    BUN 13 06/26/2024 1515    CREATININE 0.9 06/26/2024 1515    GLU 92 06/26/2024 1515        Component Value Date/Time    CALCIUM 9.2 06/26/2024 1515    ALKPHOS 45 (L) 10/11/2022 1405    AST 21 10/11/2022 1405    ALT 19 10/11/2022 1405    BILITOT 0.3 10/11/2022 1405              Assessment and Plan     Thyroid nodule  S/p benign FNA of left lobe 2.6 cm nodule  US with few small nodules not meeting FNA criteria but will continue to monitor with repeat US 11/2024    Subclinical hyperthyroidism  In past did trials of methimazole which she did not tolerate well and now off  Currently without pressing reason for treatment but if did need would chose ZHOU particularly given etiology toxic nodule  Discussed possibility of hypothyroidism after ZHOU. She would like to avoid need for thyroid hormone as long as possible  As now without pressing need for treatment ok to hold  Continue to watch TSH every 6-12 months as well as DXA  Indications would be osteoporosis, very low TSH, symptoms    Osteopenia  On HRT  Repeat DXA due now          RTC 1 yr        Erin Morales MD    Visit today included increased complexity associated with the care of the problems addressed and managing the longitudinal care of the patient due to the serious and/or complex managed problems

## 2024-06-27 NOTE — ASSESSMENT & PLAN NOTE
In past did trials of methimazole which she did not tolerate well and now off  Currently without pressing reason for treatment but if did need would chose ZHOU particularly given etiology toxic nodule  Discussed possibility of hypothyroidism after ZHOU. She would like to avoid need for thyroid hormone as long as possible  As now without pressing need for treatment ok to hold  Continue to watch TSH every 6-12 months as well as DXA  Indications would be osteoporosis, very low TSH, symptoms

## 2024-06-28 ENCOUNTER — PATIENT MESSAGE (OUTPATIENT)
Dept: ENDOCRINOLOGY | Facility: CLINIC | Age: 59
End: 2024-06-28
Payer: COMMERCIAL

## 2024-07-17 ENCOUNTER — TELEPHONE (OUTPATIENT)
Dept: OBSTETRICS AND GYNECOLOGY | Facility: CLINIC | Age: 59
End: 2024-07-17
Payer: COMMERCIAL

## 2024-07-17 DIAGNOSIS — Z12.31 SCREENING MAMMOGRAM FOR BREAST CANCER: Primary | ICD-10-CM

## 2024-07-17 NOTE — TELEPHONE ENCOUNTER
Message office received:      [facility]ney pt calling, states [facility] did not receive [facility]g orders, needs to be faxed again       - placed new [facility] orders as external  Faxed to [facility]       - called pt to let aware  Expressed understanding  No further questions    ND

## 2024-07-18 ENCOUNTER — APPOINTMENT (OUTPATIENT)
Dept: RADIOLOGY | Facility: CLINIC | Age: 59
End: 2024-07-18
Attending: INTERNAL MEDICINE
Payer: COMMERCIAL

## 2024-07-18 DIAGNOSIS — E89.40 ASYMPTOMATIC POSTSURGICAL MENOPAUSE: ICD-10-CM

## 2024-07-18 PROCEDURE — 77080 DXA BONE DENSITY AXIAL: CPT | Mod: TC,PO

## 2024-07-23 ENCOUNTER — OFFICE VISIT (OUTPATIENT)
Dept: OBSTETRICS AND GYNECOLOGY | Facility: CLINIC | Age: 59
End: 2024-07-23
Payer: COMMERCIAL

## 2024-07-23 VITALS
SYSTOLIC BLOOD PRESSURE: 118 MMHG | DIASTOLIC BLOOD PRESSURE: 85 MMHG | WEIGHT: 115.5 LBS | HEIGHT: 62 IN | BODY MASS INDEX: 21.25 KG/M2

## 2024-07-23 DIAGNOSIS — M85.80 OSTEOPENIA AFTER MENOPAUSE: ICD-10-CM

## 2024-07-23 DIAGNOSIS — Z78.0 OSTEOPENIA AFTER MENOPAUSE: ICD-10-CM

## 2024-07-23 DIAGNOSIS — N95.1 MENOPAUSAL STATE: ICD-10-CM

## 2024-07-23 DIAGNOSIS — D25.9 UTERINE LEIOMYOMA, UNSPECIFIED LOCATION: ICD-10-CM

## 2024-07-23 DIAGNOSIS — Z79.890 HORMONE REPLACEMENT THERAPY: ICD-10-CM

## 2024-07-23 DIAGNOSIS — Z01.419 ENCOUNTER FOR ANNUAL ROUTINE GYNECOLOGICAL EXAMINATION: Primary | ICD-10-CM

## 2024-07-23 DIAGNOSIS — N39.41 URGE INCONTINENCE: ICD-10-CM

## 2024-07-23 PROCEDURE — 3074F SYST BP LT 130 MM HG: CPT | Mod: CPTII,S$GLB,, | Performed by: OBSTETRICS & GYNECOLOGY

## 2024-07-23 PROCEDURE — 99396 PREV VISIT EST AGE 40-64: CPT | Mod: S$GLB,,, | Performed by: OBSTETRICS & GYNECOLOGY

## 2024-07-23 PROCEDURE — 1160F RVW MEDS BY RX/DR IN RCRD: CPT | Mod: CPTII,S$GLB,, | Performed by: OBSTETRICS & GYNECOLOGY

## 2024-07-23 PROCEDURE — 99999 PR PBB SHADOW E&M-EST. PATIENT-LVL III: CPT | Mod: PBBFAC,,, | Performed by: OBSTETRICS & GYNECOLOGY

## 2024-07-23 PROCEDURE — 1159F MED LIST DOCD IN RCRD: CPT | Mod: CPTII,S$GLB,, | Performed by: OBSTETRICS & GYNECOLOGY

## 2024-07-23 PROCEDURE — 3008F BODY MASS INDEX DOCD: CPT | Mod: CPTII,S$GLB,, | Performed by: OBSTETRICS & GYNECOLOGY

## 2024-07-23 PROCEDURE — 3079F DIAST BP 80-89 MM HG: CPT | Mod: CPTII,S$GLB,, | Performed by: OBSTETRICS & GYNECOLOGY

## 2024-07-23 RX ORDER — ESTRADIOL 1 MG/1
1 TABLET ORAL DAILY
Qty: 90 TABLET | Refills: 3 | Status: SHIPPED | OUTPATIENT
Start: 2024-07-23 | End: 2024-07-31

## 2024-07-23 RX ORDER — BETAMETHASONE DIPROPIONATE 0.5 MG/G
CREAM TOPICAL 2 TIMES DAILY
COMMUNITY
Start: 2024-07-05

## 2024-07-23 RX ORDER — PROGESTERONE 100 MG/1
100 CAPSULE ORAL NIGHTLY
Qty: 90 CAPSULE | Refills: 3 | Status: SHIPPED | OUTPATIENT
Start: 2024-07-23 | End: 2025-07-23

## 2024-07-24 ENCOUNTER — LAB VISIT (OUTPATIENT)
Dept: LAB | Facility: HOSPITAL | Age: 59
End: 2024-07-24
Attending: OBSTETRICS & GYNECOLOGY
Payer: COMMERCIAL

## 2024-07-24 DIAGNOSIS — N95.1 MENOPAUSAL STATE: ICD-10-CM

## 2024-07-24 DIAGNOSIS — Z79.890 HORMONE REPLACEMENT THERAPY: ICD-10-CM

## 2024-07-24 LAB — ESTRADIOL SERPL-MCNC: 47 PG/ML

## 2024-07-24 PROCEDURE — 82670 ASSAY OF TOTAL ESTRADIOL: CPT | Performed by: OBSTETRICS & GYNECOLOGY

## 2024-07-24 PROCEDURE — 36415 COLL VENOUS BLD VENIPUNCTURE: CPT | Performed by: OBSTETRICS & GYNECOLOGY

## 2024-07-30 ENCOUNTER — PATIENT MESSAGE (OUTPATIENT)
Dept: OBSTETRICS AND GYNECOLOGY | Facility: CLINIC | Age: 59
End: 2024-07-30
Payer: COMMERCIAL

## 2024-07-30 DIAGNOSIS — Z79.890 HORMONE REPLACEMENT THERAPY: Primary | ICD-10-CM

## 2024-07-30 DIAGNOSIS — N95.1 MENOPAUSAL STATE: ICD-10-CM

## 2024-07-31 RX ORDER — ESTRADIOL 0.05 MG/D
1 FILM, EXTENDED RELEASE TRANSDERMAL
Qty: 8 PATCH | Refills: 11 | Status: SHIPPED | OUTPATIENT
Start: 2024-08-01 | End: 2025-08-01

## 2024-07-31 NOTE — TELEPHONE ENCOUNTER
Please schedule estradiol level in 4-6 weeks with a virtual visit 2 or more days after lab done.

## 2024-09-01 NOTE — PROGRESS NOTES
"Chief Complaint: Well Woman Exam     HPI:      Erica Swann is a 58 y.o.  who presents today for well woman exam.  LMP: Patient's last menstrual period was 2020 (approximate).  No issues, problems, or complaints. Specifically, patient denies abnormal vaginal bleeding, discharge, pelvic pain, urinary problems, or changes in appetite. Ms. Swann is currently sexually active with a single male partner. She declines STD screening today. Patient does not have regular monthly menses. Patient's last menstrual period was 2020 (approximate). Menopausal complaints: stable on current regimen of Estradiol patch/Prometrium. History of urge incontinence and ready for referral to Urogyn. No dysuria. Tried modifying fluid intake.    Previous Pap: no abnormalities/HPV negative  (2023)  Previous Mammogram: BiRads: 1 T-C Score: 7.6% (DIS)  Most Recent Dexa: 24 spine -1.2/Lt hip -0.6/Lt neck -2.1 FRAX 7.9/1.1%  Colonoscopy: 2021 polyp (10yr)    OB History          5    Para   1    Term   1            AB   4    Living   1         SAB   3    IAB        Ectopic   1    Multiple        Live Births   1                 GYN History  Age of Menarche:15  Age at first pregnancy:    Age at first live birth:42        ROS:     GENERAL: Denies unintentional weight gain or weight loss. Feeling well overall.   SKIN: Denies rash or lesions.   HEENT: Denies headaches, or vision changes.   CARDIOVASCULAR: Denies palpitations or chest pain.   RESPIRATORY: Denies shortness of breath or dyspnea on exertion.  BREASTS: Denies pain, lumps, or nipple discharge.   ABDOMEN: Denies abdominal pain, constipation, diarrhea, nausea, vomiting, change in appetite.  URINARY: Denies frequency, dysuria, hematuria.  NEUROLOGIC: Denies syncope or weakness.   PSYCHIATRIC: Denies depression, anxiety or mood swings.    Physical Exam:      PHYSICAL EXAM:  /85 (BP Location: Left arm, Patient Position: Sitting)   Ht 5' 2" " (1.575 m)   Wt 52.4 kg (115 lb 8.3 oz)   LMP 07/21/2020 (Approximate)   BMI 21.13 kg/m²   Body mass index is 21.13 kg/m².     APPEARANCE: Well nourished, well developed, in no acute distress.  PSYCH: Appropriate mood and affect.  SKIN: No acne or hirsutism  NECK: Neck symmetric without masses or thyromegaly  NODES: No inguinal, axillary, or supraclavicular lymph node enlargement  ABDOMEN: Soft.  No tenderness or masses.    CARDIOVASCULAR: No edema of peripheral extremities  BREASTS: Symmetrical, no skin changes or visible lesions.  No palpable masses or nipple discharge bilaterally.  PELVIC: Normal external genitalia without lesions.  Normal hair distribution.  Adequate perineal body, normal urethral meatus.  Vagina moist and well rugated without lesions or discharge.  Cervix pink, without lesions, discharge or tenderness.  No significant cystocele or rectocele.  Bimanual exam shows uterus to be normal size, regular, mobile and nontender.  Adnexa without masses or tenderness.      Assessment/Plan:     Encounter for annual routine gynecological examination    Osteopenia after menopause    Menopausal state  -     estradioL (ESTRACE) 1 MG tablet; Take 1 tablet (1 mg total) by mouth once daily.  Dispense: 90 tablet; Refill: 3  -     progesterone (PROMETRIUM) 100 MG capsule; Take 1 capsule (100 mg total) by mouth nightly.  Dispense: 90 capsule; Refill: 3  -     ESTRADIOL; Future; Expected date: 07/23/2024    Hormone replacement therapy  -     estradioL (ESTRACE) 1 MG tablet; Take 1 tablet (1 mg total) by mouth once daily.  Dispense: 90 tablet; Refill: 3  -     progesterone (PROMETRIUM) 100 MG capsule; Take 1 capsule (100 mg total) by mouth nightly.  Dispense: 90 capsule; Refill: 3  -     ESTRADIOL; Future; Expected date: 07/23/2024    Urge incontinence  -     Ambulatory referral/consult to Urogynecology; Future; Expected date: 07/30/2024    Uterine leiomyoma, unspecified location  -     Ambulatory referral/consult to  Urogynecology; Future; Expected date: 07/30/2024    RTC 1 year    Counseling:     Patient was counseled today on current ASCCP pap guidelines, the recommendation for yearly pelvic exams, healthy diet and exercise routines, breast self awareness and annual mammograms.She is to see her PCP for other health maintenance.     Use of the [employer] [facility] [employer] Patient Portal discussed and encouraged during today's visit.       Nely Corley MD      As of April 1, 2021, the Cures Act has been passed nationally. This new law requires that all doctors progress notes, lab results, pathology reports and radiology reports be released IMMEDIATELY to the patient in the patient portal. That means that the results are released to you at the EXACT same time they are released to me. Therefore, with all of the patients that I have I am not able to reply to each patient exactly when the results come in. So there will be a delay from when you see the results to when I see them and have time to come up with a response to send you. Also I only see these results when I am on the computer at work. So if the results come in over the weekend or after 5 pm of a work day, I will not see them until the next business day. As you can tell, this is a challenge as a physician to give every patient the quick response they hope for and deserve. So please be patient! Thanks for understanding, Dr. Corley

## 2024-09-09 ENCOUNTER — TELEPHONE (OUTPATIENT)
Dept: OBSTETRICS AND GYNECOLOGY | Facility: CLINIC | Age: 59
End: 2024-09-09
Payer: COMMERCIAL

## 2024-09-09 ENCOUNTER — LAB VISIT (OUTPATIENT)
Dept: LAB | Facility: HOSPITAL | Age: 59
End: 2024-09-09
Attending: OBSTETRICS & GYNECOLOGY
Payer: COMMERCIAL

## 2024-09-09 DIAGNOSIS — N95.1 MENOPAUSAL STATE: ICD-10-CM

## 2024-09-09 DIAGNOSIS — Z79.890 HORMONE REPLACEMENT THERAPY: ICD-10-CM

## 2024-09-09 LAB — ESTRADIOL SERPL-MCNC: 21 PG/ML

## 2024-09-09 PROCEDURE — 36415 COLL VENOUS BLD VENIPUNCTURE: CPT | Performed by: OBSTETRICS & GYNECOLOGY

## 2024-09-09 PROCEDURE — 82670 ASSAY OF TOTAL ESTRADIOL: CPT | Performed by: OBSTETRICS & GYNECOLOGY

## 2024-09-09 RX ORDER — ESTRADIOL 0.1 MG/D
1 FILM, EXTENDED RELEASE TRANSDERMAL
Qty: 8 PATCH | Refills: 11 | Status: SHIPPED | OUTPATIENT
Start: 2024-09-09 | End: 2025-09-09

## 2024-09-09 NOTE — TELEPHONE ENCOUNTER
called pt / pt answered     - informed pt the call is regarding scheduling her 4 week follow up virtual for estradiol labs . pt agreed to 10/7 at 1 pm virtual call with  . no further questions or concerns.

## 2024-09-10 ENCOUNTER — PATIENT MESSAGE (OUTPATIENT)
Dept: OBSTETRICS AND GYNECOLOGY | Facility: CLINIC | Age: 59
End: 2024-09-10
Payer: COMMERCIAL

## 2024-10-02 DIAGNOSIS — N95.1 MENOPAUSAL STATE: ICD-10-CM

## 2024-10-02 DIAGNOSIS — Z79.890 HORMONE REPLACEMENT THERAPY: ICD-10-CM

## 2024-10-02 RX ORDER — PROGESTERONE 100 MG/1
100 CAPSULE ORAL NIGHTLY
Qty: 90 CAPSULE | Refills: 3 | Status: SHIPPED | OUTPATIENT
Start: 2024-10-02 | End: 2025-10-02

## 2024-10-04 ENCOUNTER — TELEPHONE (OUTPATIENT)
Dept: OBSTETRICS AND GYNECOLOGY | Facility: CLINIC | Age: 59
End: 2024-10-04
Payer: COMMERCIAL

## 2024-10-04 ENCOUNTER — LAB VISIT (OUTPATIENT)
Dept: LAB | Facility: HOSPITAL | Age: 59
End: 2024-10-04
Attending: OBSTETRICS & GYNECOLOGY
Payer: COMMERCIAL

## 2024-10-04 DIAGNOSIS — Z79.890 HORMONE REPLACEMENT THERAPY: ICD-10-CM

## 2024-10-04 DIAGNOSIS — N95.1 MENOPAUSAL STATE: ICD-10-CM

## 2024-10-04 DIAGNOSIS — N95.1 MENOPAUSAL STATE: Primary | ICD-10-CM

## 2024-10-04 LAB — ESTRADIOL SERPL-MCNC: 24 PG/ML

## 2024-10-04 PROCEDURE — 82670 ASSAY OF TOTAL ESTRADIOL: CPT | Performed by: OBSTETRICS & GYNECOLOGY

## 2024-10-04 PROCEDURE — 36415 COLL VENOUS BLD VENIPUNCTURE: CPT | Performed by: OBSTETRICS & GYNECOLOGY

## 2024-10-04 NOTE — TELEPHONE ENCOUNTER
called pt / pt answered     - the returned call is regarding scheduling pt estradiol level lab and placing the order for it . i informed pt the order was put in the system pt verbally agreed to go to [facility] lab at 2:00 pm to get blood work done . No further questions or concerns .

## 2024-10-07 ENCOUNTER — OFFICE VISIT (OUTPATIENT)
Dept: OBSTETRICS AND GYNECOLOGY | Facility: CLINIC | Age: 59
End: 2024-10-07
Payer: COMMERCIAL

## 2024-10-07 DIAGNOSIS — N95.1 MENOPAUSAL STATE: Primary | ICD-10-CM

## 2024-10-07 DIAGNOSIS — Z79.890 HORMONE REPLACEMENT THERAPY: ICD-10-CM

## 2024-10-07 PROCEDURE — 1160F RVW MEDS BY RX/DR IN RCRD: CPT | Mod: CPTII,95,, | Performed by: OBSTETRICS & GYNECOLOGY

## 2024-10-07 PROCEDURE — 99213 OFFICE O/P EST LOW 20 MIN: CPT | Mod: 95,,, | Performed by: OBSTETRICS & GYNECOLOGY

## 2024-10-07 PROCEDURE — 4010F ACE/ARB THERAPY RXD/TAKEN: CPT | Mod: CPTII,95,, | Performed by: OBSTETRICS & GYNECOLOGY

## 2024-10-07 PROCEDURE — 1159F MED LIST DOCD IN RCRD: CPT | Mod: CPTII,95,, | Performed by: OBSTETRICS & GYNECOLOGY
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
## 2024-10-09 ENCOUNTER — OFFICE VISIT (OUTPATIENT)
Dept: UROGYNECOLOGY | Facility: CLINIC | Age: 59
End: 2024-10-09
Payer: COMMERCIAL

## 2024-10-09 VITALS
WEIGHT: 116.38 LBS | HEART RATE: 85 BPM | SYSTOLIC BLOOD PRESSURE: 155 MMHG | BODY MASS INDEX: 21.42 KG/M2 | HEIGHT: 62 IN | DIASTOLIC BLOOD PRESSURE: 95 MMHG

## 2024-10-09 DIAGNOSIS — N85.8 OTHER SPECIFIED NONINFLAMMATORY DISORDERS OF UTERUS: ICD-10-CM

## 2024-10-09 DIAGNOSIS — D25.9 UTERINE LEIOMYOMA, UNSPECIFIED LOCATION: ICD-10-CM

## 2024-10-09 DIAGNOSIS — N95.2 VAGINAL ATROPHY: ICD-10-CM

## 2024-10-09 DIAGNOSIS — N39.41 URGE INCONTINENCE: ICD-10-CM

## 2024-10-09 DIAGNOSIS — R35.1 NOCTURIA MORE THAN TWICE PER NIGHT: ICD-10-CM

## 2024-10-09 DIAGNOSIS — K59.09 CHRONIC CONSTIPATION: ICD-10-CM

## 2024-10-09 DIAGNOSIS — N39.46 URINARY INCONTINENCE, MIXED: Primary | ICD-10-CM

## 2024-10-09 DIAGNOSIS — D25.0 SUBMUCOUS LEIOMYOMA OF UTERUS: ICD-10-CM

## 2024-10-09 PROCEDURE — 87186 SC STD MICRODIL/AGAR DIL: CPT | Performed by: OBSTETRICS & GYNECOLOGY

## 2024-10-09 PROCEDURE — 87088 URINE BACTERIA CULTURE: CPT | Performed by: OBSTETRICS & GYNECOLOGY

## 2024-10-09 PROCEDURE — 87077 CULTURE AEROBIC IDENTIFY: CPT | Performed by: OBSTETRICS & GYNECOLOGY

## 2024-10-09 PROCEDURE — 99999 PR PBB SHADOW E&M-EST. PATIENT-LVL V: CPT | Mod: PBBFAC,,, | Performed by: OBSTETRICS & GYNECOLOGY

## 2024-10-09 PROCEDURE — 87086 URINE CULTURE/COLONY COUNT: CPT | Performed by: OBSTETRICS & GYNECOLOGY

## 2024-10-09 RX ORDER — ESTRADIOL 0.1 MG/G
CREAM VAGINAL
Qty: 42.5 G | Refills: 11 | Status: SHIPPED | OUTPATIENT
Start: 2024-10-09

## 2024-10-09 RX ORDER — IRBESARTAN 150 MG/1
1 TABLET ORAL NIGHTLY
COMMUNITY
Start: 2024-08-21 | End: 2025-08-21
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
## 2024-10-09 NOTE — PATIENT INSTRUCTIONS
Bladder Irritants  Certain foods and drinks have been associated with worsening symptoms of urinary frequency, urgency, urge incontinence, or bladder pain. If you suffer from any of these conditions, you may wish to try eliminating one or more of these foods from your diet and see if your symptoms improve. If bladder symptoms are related to dietary factors, strict adherence to a diet thateliminates the food should bring marked relief in 10 days. Once you are feeling better, you can begin to add foods back into your diet, one at a time. If symptoms return, you will be able to identify the irritant. As you add foods back to your diet it is very important that you drink significant amounts of water.    -----------------------------------------------------------------------------------------------  List of Common Bladder Irritants*  Alcoholic beverages  Apples and apple juice  Cantaloupe  Carbonated beverages  Chili and spicy foods  Chocolate  Citrus fruit  Coffee (including decaffeinated)  Cranberries and cranberry juice  Grapes  Guava  Milk Products: milk, cheese, cottage cheese, yogurt, ice cream  Peaches  Pineapple  Plums  Strawberries  Sugar especially artificial sweeteners, saccharin, aspartame, corn sweeteners, honey, fructose, sucrose, lactose  Tea  Tomatoes and tomato juice  Vitamin B complex  Vinegar  *Most people are not sensitive to ALL of these products; your goal is to find the foods that make YOUR symptoms worse.  ---------------------------------------------------------------------------------------------------    Low-acid fruit substitutions include apricots, papaya, pears and watermelon. Coffee drinkers can drink Kava or other lowacid instant drinks. Tea drinkers can substitute non-citrus herbal and sun brewed teas. Calcium carbonate co-buffered with calcium ascorbate can be substituted for Vitamin C. Prelief is a dietary supplement that works as an acid blocker for the bladder.    Where to get more  information:        Overcoming Bladder Disorders by Kianna Solomon and Joon Lutz, 1990        You Dont Have to Live with Cystitis! By Halle Kimball, 1988  http://www.urologymanagement.org/oab  -----------------------------------------------------    Fiber Information Sheet  Your doctor has recommended that you follow a high fiber diet. The addition of fiber to your diet can make an enormous difference in your bowel control and regularity. Fiber helps people whether they lose stool or have trouble with constipation. Fiber works by bulking the stool and keeping it formed, yet making the movement soft and easy to pass. Fiber helps keep moisture within the stool so that neither diarrhea nor hard stool occurs. Fiber makes the bowels work more regularly, but it is not a laxative. An additional bonus from eating a high fiber diet is that your risk of cancer is reduced, too.    Most of us eat some high fiber foods already, but nearly all of us do not eat the necessary amount. For example, a slice of whole wheat bread contains only about 10% of the daily recommended amount of fiber. This means if you are relying on only whole wheat bread to meet the recommended fiber requirements, you would need to eat  between 10-18 slices every day! Please note that fiber is NOT in any meat or dairy product. It is only found in grains, vegetables and fruits. The recommended daily fiber intake is 20-25 grams. Foods having high fiber content include:     Fiber One Cereal, ½ cup 13.0 g   Perez beans, ¾ cup 10.4 g   Wheat bran cereal, 1 oz 10.0 g   Kidney beans, ¾ cup 9.3 g   All Bran Cereal, ½ cup 6.0 g   Oat Bran Cereal, hot, 1 oz 4.0 g   Banana, 1medium 3.8 g   Canned pears, ½ cup 3.7 g   3 prunes or ¼ cup raisins 3.5 g   Whole Wheat Total, 1 cup 3.0 g   Carrots, ½ cup 3.2 g   Apple, small 2.8 g   Broccoli, ½ cup 2.8 g   Cauliflower, ½ cup 2.6 g   Oatmeal, 1 oz 2.5 g   Whole Wheat Toast 2.0 g    Cheerios, 1 1/3 cup 2.0 g   Baked potato with skin 2.0 g   Corn, ½ cup 1.9 g   Popcorn, 3 cups 1.9 g   Orange, medium 1.9 g   Granola bar 1.0 g   Lettuce, ½ cup 0.9 g    If you dont think that you can get enough fiber through your everyday diet, there are many good fiber supplements you can take along with eating your high fiber diet. Some of these are: Metamucil (1 heaping teaspoon or 1-2 wafers), Citrucel (1 tablespoon), Fiberall (1-2 wafers or 1 teaspoon), Perdium (2 rounded teaspoons) and 1-2 teaspoons unprocessed bran (to mix with foods)    You may need to use the fiber supplement up to 3-4 times daily to produce normal elimination. Please follow specific package directions or call us for help in regulating the dose. You may notice some bloating and/or increased gas at first. These symptoms can be relieved by adding fiber to your diet slowly. Once your body gets used to this increased fiber, these symptoms will go away.   ---------------------------------------  1)  Mixed urinary incontinence, urge > stress:    --urine C&S  --Empty bladder every 3 hours.  Empty well: wait a minute, lean forward on toilet.    --Avoid dietary irritants (see sheet).  Keep diary x 3-5 days to determine your irritants.  --KEGELS: do 10 in AM and 10 in PM, holding each x 10 seconds.  When you feel urge to go, STOP, KEGEL, and when urge has passed, then go to bathroom.  Call PT to start.     [facility]  [facility] ([address], [place] [address]): (p) [phone].  (f) [phone].    --URGE: consider medication in future. Takes 2-4 weeks to see if will have effect.  For dry mouth: get sour, sugar free lozenge or gum.   --non drying/constipation: mirabegron, vibegron   --STRESS:  Pessary vs. Sling vs bulking  --r/o uterine fibroid position in relation to bladder: MRI pelvis   --2022 pelvic US with 2 cm fibroid anteriorly but feels bulkier on exam today   --is this really affecting urgency and incontinence?    2)   Vaginal atrophy (dryness):    --start Vaginal estrogen:  --Use 0.5 grams of estrogen cream in vagina with applicator or dime-sized amount with finger (as far as can reach internally) nightly x 2 weeks, then twice a week thereafter.  You can also apply a dime-sized amount with your finger around the vaginal opening and inner lips at same frequency.     --Vaginal estrogen may help to decrease pain related to dryness with intercourse and urinary symptoms (urgency/frequency/UTIs) around menopause.     --for intercourse, can try Non-estrogen options for vaginal dryness:  --coconut oil: dime-sized amount with finger (as far as can reach internally) and around the vaginal opening and inner lips up to nightly as needed  --Uberlube, [place], [place] liquibeads, Satin by Sliquid Natural Intimate Moisturizer    3)  Constipation:  --hydrate well  Controlling constipation may help bladder urgency/leakage and fiber may better control cholesterol and blood glucose.  Start daily fiber.  Take 1 tsp of fiber powder (psyllium or other sugar-free powder).  Mix in 8 oz of water.  Take x 3-5 days.  Then, increase fiber by 1 tsp every 3-5 days until stool is easy to pass.  Stop and continue at that dose.   Do not exceed 6 tsps/day.  May also use over the counter stool softener 1-2 x/day.  AVOID laxatives.    4)  Nocturia (nighttime urination):   --stop fluids 2 hours before bed.    --no water by bed  --If have leg swelling:  Elevate feet above chest x 1 hour before bed to get excess fluid off.  Can also use support hose (knee highs).      5)  Get MR.  Call PT.  RTC 3-4 months.      ---------------

## 2024-10-10 ENCOUNTER — PATIENT MESSAGE (OUTPATIENT)
Dept: UROGYNECOLOGY | Facility: CLINIC | Age: 59
End: 2024-10-10
Payer: COMMERCIAL

## 2024-10-12 ENCOUNTER — PATIENT MESSAGE (OUTPATIENT)
Dept: UROGYNECOLOGY | Facility: CLINIC | Age: 59
End: 2024-10-12
Payer: COMMERCIAL

## 2024-10-12 DIAGNOSIS — N39.0 ACUTE UTI: Primary | ICD-10-CM

## 2024-10-12 LAB — BACTERIA UR CULT: ABNORMAL

## 2024-10-12 RX ORDER — NITROFURANTOIN 25; 75 MG/1; MG/1
100 CAPSULE ORAL 2 TIMES DAILY
Qty: 14 CAPSULE | Refills: 0 | Status: SHIPPED | OUTPATIENT
Start: 2024-10-12 | End: 2024-10-19

## 2024-10-14 ENCOUNTER — TELEPHONE (OUTPATIENT)
Dept: UROGYNECOLOGY | Facility: CLINIC | Age: 59
End: 2024-10-14
Payer: COMMERCIAL

## 2024-10-14 NOTE — TELEPHONE ENCOUNTER
Called pt to relay message. Pt stated that she started the Macrobid on Saturday. Call ended.      ----- Message from Wilma Cr MD sent at 10/12/2024  1:54 PM CDT -----  Please contact patient Monday and make sure she got message I sent to her patient portal over weekend:  Looks like you have a bladder infection. I sent Macrobid to her [facility] for you to /start. The infection could be causing some of the urinary symptoms, as well. But, please move forward with all things we discussed at your previous visit, too.   Thanks!

## 2024-10-22 DIAGNOSIS — R35.1 NOCTURIA: Primary | ICD-10-CM

## 2024-10-22 RX ORDER — MIRABEGRON 50 MG/1
50 TABLET, EXTENDED RELEASE ORAL DAILY
Qty: 30 EACH | Refills: 11 | Status: SHIPPED | OUTPATIENT
Start: 2024-10-22 | End: 2025-10-22

## 2024-10-31 ENCOUNTER — HOSPITAL ENCOUNTER (OUTPATIENT)
Dept: RADIOLOGY | Facility: HOSPITAL | Age: 59
Discharge: HOME | End: 2024-10-31
Attending: OBSTETRICS & GYNECOLOGY
Payer: COMMERCIAL

## 2024-10-31 ENCOUNTER — PATIENT MESSAGE (OUTPATIENT)
Dept: OBSTETRICS AND GYNECOLOGY | Facility: CLINIC | Age: 59
End: 2024-10-31
Payer: COMMERCIAL

## 2024-10-31 DIAGNOSIS — N85.8 OTHER SPECIFIED NONINFLAMMATORY DISORDERS OF UTERUS: ICD-10-CM

## 2024-10-31 DIAGNOSIS — D25.0 SUBMUCOUS LEIOMYOMA OF UTERUS: ICD-10-CM

## 2024-10-31 PROCEDURE — A9585 GADOBUTROL INJECTION: HCPCS | Performed by: OBSTETRICS & GYNECOLOGY

## 2024-10-31 PROCEDURE — 72197 MRI PELVIS W/O & W/DYE: CPT | Mod: TC

## 2024-10-31 PROCEDURE — 72197 MRI PELVIS W/O & W/DYE: CPT | Mod: 26,,, | Performed by: RADIOLOGY

## 2024-10-31 PROCEDURE — 25500020 PHARM REV CODE 255: Performed by: OBSTETRICS & GYNECOLOGY

## 2024-10-31 RX ORDER — GADOBUTROL 604.72 MG/ML
10 INJECTION INTRAVENOUS
Status: COMPLETED | OUTPATIENT
Start: 2024-10-31 | End: 2024-10-31

## 2024-10-31 RX ADMIN — GADOBUTROL 10 ML: 604.72 INJECTION INTRAVENOUS at 12:10

## 2024-11-01 ENCOUNTER — PATIENT MESSAGE (OUTPATIENT)
Dept: OBSTETRICS AND GYNECOLOGY | Facility: CLINIC | Age: 59
End: 2024-11-01
Payer: COMMERCIAL

## 2024-11-05 ENCOUNTER — LAB VISIT (OUTPATIENT)
Dept: LAB | Facility: HOSPITAL | Age: 59
End: 2024-11-05
Attending: NURSE PRACTITIONER
Payer: COMMERCIAL

## 2024-11-05 DIAGNOSIS — E05.90 SUBCLINICAL HYPERTHYROIDISM: ICD-10-CM

## 2024-11-05 DIAGNOSIS — N95.1 MENOPAUSAL STATE: ICD-10-CM

## 2024-11-05 LAB
ESTRADIOL SERPL-MCNC: 39 PG/ML
T4 FREE SERPL-MCNC: 0.74 NG/DL (ref 0.71–1.51)
TSH SERPL DL<=0.005 MIU/L-ACNC: 0.31 UIU/ML (ref 0.4–4)

## 2024-11-05 PROCEDURE — 84443 ASSAY THYROID STIM HORMONE: CPT | Performed by: INTERNAL MEDICINE

## 2024-11-05 PROCEDURE — 84439 ASSAY OF FREE THYROXINE: CPT | Performed by: INTERNAL MEDICINE

## 2024-11-05 PROCEDURE — 82670 ASSAY OF TOTAL ESTRADIOL: CPT | Performed by: OBSTETRICS & GYNECOLOGY

## 2024-11-05 PROCEDURE — 36415 COLL VENOUS BLD VENIPUNCTURE: CPT | Performed by: INTERNAL MEDICINE

## 2024-11-06 ENCOUNTER — TELEPHONE (OUTPATIENT)
Dept: OBSTETRICS AND GYNECOLOGY | Facility: CLINIC | Age: 59
End: 2024-11-06
Payer: COMMERCIAL

## 2024-11-06 DIAGNOSIS — N95.1 MENOPAUSAL STATE: Primary | ICD-10-CM

## 2024-11-06 NOTE — TELEPHONE ENCOUNTER
called pt /pt answered     - informed pt the call is regarding scheduling estradiol level lab . pt agreed to come on 12/5 @ [facility] for 8:30 am no further questions or concerns .

## 2024-11-14 ENCOUNTER — HOSPITAL ENCOUNTER (OUTPATIENT)
Dept: RADIOLOGY | Facility: HOSPITAL | Age: 59
Discharge: HOME | End: 2024-11-14
Attending: INTERNAL MEDICINE
Payer: COMMERCIAL

## 2024-11-14 DIAGNOSIS — E04.1 THYROID NODULE: ICD-10-CM

## 2024-11-14 PROCEDURE — 76536 US EXAM OF HEAD AND NECK: CPT | Mod: 26,,, | Performed by: RADIOLOGY

## 2024-11-14 PROCEDURE — 76536 US EXAM OF HEAD AND NECK: CPT | Mod: TC

## 2024-11-15 ENCOUNTER — PATIENT MESSAGE (OUTPATIENT)
Dept: ENDOCRINOLOGY | Facility: CLINIC | Age: 59
End: 2024-11-15
Payer: COMMERCIAL

## 2024-11-18 ENCOUNTER — PATIENT MESSAGE (OUTPATIENT)
Dept: UROGYNECOLOGY | Facility: CLINIC | Age: 59
End: 2024-11-18
Payer: COMMERCIAL

## 2024-11-19 DIAGNOSIS — K59.09 CHRONIC CONSTIPATION: ICD-10-CM

## 2024-11-19 DIAGNOSIS — R35.1 NOCTURIA: Primary | ICD-10-CM

## 2024-11-19 DIAGNOSIS — N39.41 URGE INCONTINENCE: ICD-10-CM

## 2024-11-22 ENCOUNTER — PATIENT MESSAGE (OUTPATIENT)
Dept: OBSTETRICS AND GYNECOLOGY | Facility: CLINIC | Age: 59
End: 2024-11-22
Payer: COMMERCIAL

## 2024-11-25 DIAGNOSIS — Z79.890 HORMONE REPLACEMENT THERAPY: ICD-10-CM

## 2024-11-25 DIAGNOSIS — N95.1 MENOPAUSAL STATE: Primary | ICD-10-CM

## 2024-11-25 RX ORDER — PROGESTERONE 100 MG/1
100 CAPSULE ORAL NIGHTLY
Qty: 90 CAPSULE | Refills: 3 | Status: SHIPPED | OUTPATIENT
Start: 2024-11-25 | End: 2024-11-29 | Stop reason: DRUGHIGH

## 2024-11-29 RX ORDER — PROGESTERONE 200 MG/1
200 CAPSULE ORAL NIGHTLY
Qty: 90 CAPSULE | Refills: 3 | Status: SHIPPED | OUTPATIENT
Start: 2024-11-29 | End: 2025-11-29

## 2024-12-05 ENCOUNTER — LAB VISIT (OUTPATIENT)
Dept: LAB | Facility: HOSPITAL | Age: 59
End: 2024-12-05
Attending: INTERNAL MEDICINE
Payer: COMMERCIAL

## 2024-12-05 DIAGNOSIS — N95.1 MENOPAUSAL STATE: ICD-10-CM

## 2024-12-05 LAB — ESTRADIOL SERPL-MCNC: 64 PG/ML

## 2024-12-05 PROCEDURE — 36415 COLL VENOUS BLD VENIPUNCTURE: CPT | Performed by: OBSTETRICS & GYNECOLOGY

## 2024-12-05 PROCEDURE — 82670 ASSAY OF TOTAL ESTRADIOL: CPT | Performed by: OBSTETRICS & GYNECOLOGY

## 2024-12-09 ENCOUNTER — PATIENT MESSAGE (OUTPATIENT)
Dept: OBSTETRICS AND GYNECOLOGY | Facility: CLINIC | Age: 59
End: 2024-12-09
Payer: COMMERCIAL

## 2024-12-16 ENCOUNTER — LAB VISIT (OUTPATIENT)
Dept: LAB | Facility: HOSPITAL | Age: 59
End: 2024-12-16
Attending: OBSTETRICS & GYNECOLOGY
Payer: COMMERCIAL

## 2024-12-16 ENCOUNTER — TELEPHONE (OUTPATIENT)
Dept: OBSTETRICS AND GYNECOLOGY | Facility: CLINIC | Age: 59
End: 2024-12-16
Payer: COMMERCIAL

## 2024-12-16 DIAGNOSIS — N39.0 URINARY TRACT INFECTION WITHOUT HEMATURIA, SITE UNSPECIFIED: ICD-10-CM

## 2024-12-16 DIAGNOSIS — R39.89 SUSPECTED UTI: Primary | ICD-10-CM

## 2024-12-16 PROCEDURE — 87088 URINE BACTERIA CULTURE: CPT | Performed by: OBSTETRICS & GYNECOLOGY

## 2024-12-16 PROCEDURE — 87086 URINE CULTURE/COLONY COUNT: CPT | Performed by: OBSTETRICS & GYNECOLOGY

## 2024-12-16 NOTE — TELEPHONE ENCOUNTER
Dr Corley pt calling, pt has a bladder infection, would like to discuss. Pt # [phone]       E visit initiated.

## 2024-12-18 ENCOUNTER — PATIENT MESSAGE (OUTPATIENT)
Dept: OBSTETRICS AND GYNECOLOGY | Facility: CLINIC | Age: 59
End: 2024-12-18
Payer: COMMERCIAL

## 2024-12-18 ENCOUNTER — TELEPHONE (OUTPATIENT)
Dept: OBSTETRICS AND GYNECOLOGY | Facility: CLINIC | Age: 59
End: 2024-12-18

## 2024-12-18 LAB — BACTERIA UR CULT: ABNORMAL

## 2024-12-18 NOTE — TELEPHONE ENCOUNTER
Pt has urine culture results back she is requesting medication pls review results and send in medication if needed .      Dr Corley pt calling, pt just got her lab results and needs medication sent in. Pt #   [phone]

## 2024-12-20 ENCOUNTER — OFFICE VISIT (OUTPATIENT)
Dept: OBSTETRICS AND GYNECOLOGY | Facility: CLINIC | Age: 59
End: 2024-12-20
Payer: COMMERCIAL

## 2024-12-20 VITALS
SYSTOLIC BLOOD PRESSURE: 164 MMHG | DIASTOLIC BLOOD PRESSURE: 92 MMHG | BODY MASS INDEX: 21.69 KG/M2 | WEIGHT: 118.63 LBS

## 2024-12-20 DIAGNOSIS — N76.0 ACUTE VAGINITIS: ICD-10-CM

## 2024-12-20 DIAGNOSIS — Z12.31 OTHER SCREENING MAMMOGRAM: ICD-10-CM

## 2024-12-20 DIAGNOSIS — N93.9 VAGINAL SPOTTING: Primary | ICD-10-CM

## 2024-12-20 PROCEDURE — 3080F DIAST BP >= 90 MM HG: CPT | Mod: CPTII,S$GLB,, | Performed by: OBSTETRICS & GYNECOLOGY

## 2024-12-20 PROCEDURE — 0352U VAGINOSIS SCREEN BY DNA PROBE: CPT | Performed by: OBSTETRICS & GYNECOLOGY

## 2024-12-20 PROCEDURE — 99213 OFFICE O/P EST LOW 20 MIN: CPT | Mod: S$GLB,,, | Performed by: OBSTETRICS & GYNECOLOGY

## 2024-12-20 PROCEDURE — 1160F RVW MEDS BY RX/DR IN RCRD: CPT | Mod: CPTII,S$GLB,, | Performed by: OBSTETRICS & GYNECOLOGY

## 2024-12-20 PROCEDURE — 1159F MED LIST DOCD IN RCRD: CPT | Mod: CPTII,S$GLB,, | Performed by: OBSTETRICS & GYNECOLOGY

## 2024-12-20 PROCEDURE — 99999 PR PBB SHADOW E&M-EST. PATIENT-LVL III: CPT | Mod: PBBFAC,,, | Performed by: OBSTETRICS & GYNECOLOGY

## 2024-12-20 PROCEDURE — 3077F SYST BP >= 140 MM HG: CPT | Mod: CPTII,S$GLB,, | Performed by: OBSTETRICS & GYNECOLOGY

## 2024-12-20 PROCEDURE — 3008F BODY MASS INDEX DOCD: CPT | Mod: CPTII,S$GLB,, | Performed by: OBSTETRICS & GYNECOLOGY

## 2024-12-23 ENCOUNTER — PATIENT MESSAGE (OUTPATIENT)
Dept: OBSTETRICS AND GYNECOLOGY | Facility: CLINIC | Age: 59
End: 2024-12-23
Payer: COMMERCIAL

## 2024-12-23 LAB
BACTERIAL VAGINOSIS DNA: NOT DETECTED
CANDIDA GLABRATA/KRUSEI: NOT DETECTED
CANDIDA RRNA VAG QL PROBE: NOT DETECTED
TRICHOMONAS VAGINALIS: NOT DETECTED

## 2025-01-02 NOTE — PROGRESS NOTES
Chief Complaint: Vaginal Pruritis     HPI:      Erica Swann is a 59 y.o.  who presents with complaint of vaginal pruritis for 1 week.  She reports itching, reports odor.  She states the discharge is white and light pink/red blood tinged.  She is currently sexually active with a single male partner.  She has not experienced symptoms like this before. Patient does not douche. Patient's last menstrual period was 2020 (approximate). Also requesting mammogram orders.    Physical Exam:      PHYSICAL EXAM:   Vitals:    24 1515   BP: (!) 164/92   Weight: 53.8 kg (118 lb 9.7 oz)   PainSc: 0-No pain   Repeat /85  Body mass index is 21.69 kg/m².    General: No acute distress, alert and engaged  Pelvic: External genitalia and urethra within normal limits.    Vagina without lesions, with discharge, with erythema, without ulcers. No vaginal bleeding noted.   Cervix without cervical motion tenderness, non-friable.   Uterus normal in size and nontender. No adnexal masses or tenderness palpated.    Assessment/Plan:     Vaginal spotting  -     Vaginosis Screen by DNA Probe; Future; Expected date: 2024    Acute vaginitis        -     Vaginosis Screen by DNA Probe; Future; Expected date: 2024        - Offered to start treatment but prefers to wait for results first as using OTC meds right now.    Other screening mammogram  -     Mammo Digital Screening Bryn huffman/ Tariq Corley MD    Use of [facility] and Patient Portal recommended to patient today.

## 2025-01-13 ENCOUNTER — TELEPHONE (OUTPATIENT)
Dept: OBSTETRICS AND GYNECOLOGY | Facility: CLINIC | Age: 60
End: 2025-01-13

## 2025-01-13 NOTE — TELEPHONE ENCOUNTER
"Pt's insurance company isn't covering affirm stating the dx code used is "Experimental".  They instructed her to call office to have dx code changed but didn't give suggestion as to what dx code would be covered.  Suggested calling ins co back to see which codes are accepted for this test but informed her some insurance companies don't cover this test.      It looks like affirm is dx as "vaginal spotting".  Not sure if changing to vaginal pruritus would help?  What do you recommend?    "

## 2025-02-12 ENCOUNTER — OFFICE VISIT (OUTPATIENT)
Dept: UROGYNECOLOGY | Facility: CLINIC | Age: 60
End: 2025-02-12
Payer: COMMERCIAL

## 2025-02-12 VITALS
HEART RATE: 82 BPM | BODY MASS INDEX: 22.03 KG/M2 | SYSTOLIC BLOOD PRESSURE: 157 MMHG | WEIGHT: 119.69 LBS | HEIGHT: 62 IN | DIASTOLIC BLOOD PRESSURE: 80 MMHG

## 2025-02-12 DIAGNOSIS — D25.9 UTERINE LEIOMYOMA, UNSPECIFIED LOCATION: ICD-10-CM

## 2025-02-12 DIAGNOSIS — K59.09 CHRONIC CONSTIPATION: ICD-10-CM

## 2025-02-12 DIAGNOSIS — N95.2 VAGINAL ATROPHY: ICD-10-CM

## 2025-02-12 DIAGNOSIS — N39.41 URGE INCONTINENCE: ICD-10-CM

## 2025-02-12 DIAGNOSIS — N95.0 POST-MENOPAUSAL BLEEDING: ICD-10-CM

## 2025-02-12 DIAGNOSIS — N39.46 URINARY INCONTINENCE, MIXED: ICD-10-CM

## 2025-02-12 DIAGNOSIS — D25.0 SUBMUCOUS LEIOMYOMA OF UTERUS: ICD-10-CM

## 2025-02-12 DIAGNOSIS — R35.1 NOCTURIA: Primary | ICD-10-CM

## 2025-02-12 DIAGNOSIS — N85.8 OTHER SPECIFIED NONINFLAMMATORY DISORDERS OF UTERUS: ICD-10-CM

## 2025-02-12 PROCEDURE — 1159F MED LIST DOCD IN RCRD: CPT | Mod: CPTII,S$GLB,, | Performed by: OBSTETRICS & GYNECOLOGY

## 2025-02-12 PROCEDURE — 3079F DIAST BP 80-89 MM HG: CPT | Mod: CPTII,S$GLB,, | Performed by: OBSTETRICS & GYNECOLOGY

## 2025-02-12 PROCEDURE — 1160F RVW MEDS BY RX/DR IN RCRD: CPT | Mod: CPTII,S$GLB,, | Performed by: OBSTETRICS & GYNECOLOGY

## 2025-02-12 PROCEDURE — 88305 TISSUE EXAM BY PATHOLOGIST: CPT | Performed by: PATHOLOGY

## 2025-02-12 PROCEDURE — 3077F SYST BP >= 140 MM HG: CPT | Mod: CPTII,S$GLB,, | Performed by: OBSTETRICS & GYNECOLOGY

## 2025-02-12 PROCEDURE — 3008F BODY MASS INDEX DOCD: CPT | Mod: CPTII,S$GLB,, | Performed by: OBSTETRICS & GYNECOLOGY
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
## 2025-02-12 NOTE — PATIENT INSTRUCTIONS
1)  Mixed urinary incontinence, urge > stress:    --urine C&S  --Empty bladder every 3 hours.  Empty well: wait a minute, lean forward on toilet.    --Avoid dietary irritants (see sheet).  Keep diary x 3-5 days to determine your irritants.  --continue pelvic floor PT exercises  [facility] (all take Medicaid):  1)  [facility]/[place]: (p) [phone]. (f) [phone]. Established patients call:  [phone].  2)  [facility]/[place]: (p) [phone]  . (f) [phone].    3)  [facility]: (p) [phone].  Or [phone].   4)  [facility]. (p) [phone].    5)  [facility] (Melva Pratt or other): [address] [place] [address]. Patients can park in the [place] entrance and it is on the first floor. (p) [phone] or (p) [phone] (). (f) [phone]. (f) [phone].    --no men or defecatory disorders  6)  [facility] ([place] [place] or other): (p) [phone].   7)  [facility]: [address]  (p) [phone]  8)  [facility]: (p) [phone].      --URGE: continue mirabegron 50 mg daily. Takes 2-4 weeks to see if will have effect.  For dry mouth: get sour, sugar free lozenge or gum.   --non drying/constipation: mirabegron, vibegron   --STRESS:  Pessary vs. Sling vs bulking  --r/o uterine fibroid position in relation to bladder: MRI pelvis   --2022 pelvic US with 2 cm fibroid anteriorly but feels bulkier on exam today   --is this really affecting urgency and incontinence?    2)  Vaginal atrophy (dryness):    --start Vaginal estrogen:  --Use 0.5 grams of estrogen cream in vagina with applicator or dime-sized amount with finger (as far as can reach internally) nightly x 2 weeks, then twice a week thereafter.  You can also apply a dime-sized amount with your finger around the vaginal opening and inner lips at same frequency.     --Vaginal estrogen may help to decrease pain related to dryness with intercourse and  urinary symptoms (urgency/frequency/UTIs) around menopause.     --for intercourse, can try Non-estrogen options for vaginal dryness:  --coconut oil: dime-sized amount with finger (as far as can reach internally) and around the vaginal opening and inner lips up to nightly as needed  --Uberlube, [place], [place] liquibeads, Satin by Sliquid Natural Intimate Moisturizer    3)  Constipation:  --hydrate well  --squatty potty  --increased dietary fiber  --eat fiber gummies    4)  Nocturia (nighttime urination):   --stop fluids 2 hours before bed.    --no water by bed  --If have leg swelling:  Elevate feet above chest x 1 hour before bed to get excess fluid off.  Can also use support hose (knee highs).      5)   bleeding:  --MR 10/2024 EMB 4-5 mm  --no other recent US  --started a little before HRT  --having a intermittent spotting now--EMBx done today  --do we consider hysterectomy, darío with bladder symptoms?   --will this help?   --worried about potential for future POP but had c/s; consider USS at time of hysterectomy    --would keep ovaries    6)  Will call you with EMBx results and figure out next steps

## 2025-02-14 LAB
FINAL PATHOLOGIC DIAGNOSIS: NORMAL
GROSS: NORMAL
Lab: NORMAL

## 2025-02-20 ENCOUNTER — RESULTS FOLLOW-UP (OUTPATIENT)
Dept: UROGYNECOLOGY | Facility: CLINIC | Age: 60
End: 2025-02-20
Payer: COMMERCIAL

## 2025-02-24 ENCOUNTER — TELEPHONE (OUTPATIENT)
Dept: UROGYNECOLOGY | Facility: CLINIC | Age: 60
End: 2025-02-24
Payer: COMMERCIAL

## 2025-02-24 NOTE — TELEPHONE ENCOUNTER
Pt can't schedule off this referral per [facility] scheduling. Pt requests new orders.       ----- Message from [employer] sent at 2/24/2025  9:41 AM CST -----  Type: General Call BackName of Caller:Pt Would the patient rather a call back or a response via [facility]? [facility] Back Number:[phone] Information: Pt called requesting orders to be resubmitted. Please call pt with further information

## 2025-02-25 DIAGNOSIS — N39.46 URINARY INCONTINENCE, MIXED: Primary | ICD-10-CM

## 2025-03-06 ENCOUNTER — CLINICAL SUPPORT (OUTPATIENT)
Dept: REHABILITATION | Facility: OTHER | Age: 60
End: 2025-03-06
Attending: OBSTETRICS & GYNECOLOGY
Payer: COMMERCIAL

## 2025-03-06 DIAGNOSIS — M62.89 PELVIC FLOOR DYSFUNCTION: Primary | ICD-10-CM

## 2025-03-06 DIAGNOSIS — N39.46 URINARY INCONTINENCE, MIXED: ICD-10-CM

## 2025-03-06 PROCEDURE — 97112 NEUROMUSCULAR REEDUCATION: CPT | Mod: PN

## 2025-03-06 PROCEDURE — 97530 THERAPEUTIC ACTIVITIES: CPT | Mod: PN

## 2025-03-06 PROCEDURE — 97161 PT EVAL LOW COMPLEX 20 MIN: CPT | Mod: PN
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
## 2025-03-06 NOTE — PATIENT INSTRUCTIONS
1-2 sets of 10 reps of each exercise  Supine transverse abdominus (TrA) brace 2x10  - Inhale to prepare, relax the belly and pelvic floor  - As you exhale, gently engage the transverse abdominis by drawing the belly button down to the spine.  - Inhale again to rest  *Don't hold your breath  *Can perform lying down or sitting up         Bridging, 2-3 sets of 10  - Inhale to prepare, relax the belly and pelvic floor  - As you exhale, gently engage the transverse abdominis by drawing the belly button up and in towards the spine  - Holding the belly button in, lift the hips (only lift as high as it is comfortable) and lower  - Inhale again to rest  *Don't hold your breath          360 Breath - Inhale long, slow and deep. You should feel as if your lower ribs are expanding in all directions like the way an umbrella opens. You should feel the belly, back and sides gently expand and you may notice a relaxation in the pelvic floor.     Continue to breath like this for 2-5 minutes. Repeat 1-3 times/day.   (If you are short on time even just 3 breaths is better than none).

## 2025-03-21 ENCOUNTER — CLINICAL SUPPORT (OUTPATIENT)
Dept: REHABILITATION | Facility: OTHER | Age: 60
End: 2025-03-21
Payer: COMMERCIAL

## 2025-03-21 DIAGNOSIS — M62.89 PELVIC FLOOR DYSFUNCTION: Primary | ICD-10-CM

## 2025-03-21 PROCEDURE — 97530 THERAPEUTIC ACTIVITIES: CPT | Mod: PN

## 2025-03-21 PROCEDURE — 97110 THERAPEUTIC EXERCISES: CPT | Mod: PN

## 2025-03-21 PROCEDURE — 97112 NEUROMUSCULAR REEDUCATION: CPT | Mod: PN

## 2025-03-21 NOTE — PROGRESS NOTES
Outpatient Rehab  Physical Therapy Visit    Patient Name: Erica Swann  MRN: [id number]  YOB: 1965  Today's Date: 3/21/2025    Therapy Diagnosis:   Encounter Diagnosis   Name Primary?    Pelvic floor dysfunction Yes     Referring Provider: Wilma rC MD    Referring Provider Orders: Eval and Treat  Medical Diagnosis from Referral:  Urinary incontinence, mixed [N39.46]   Visit # / Visits Authorized: 2/12  Date of Evaluation:  3/6/2025   Insurance Authorization Period: expires 12/31/2025  Plan of Care Certification: 3/6/2025 to 5/29/2025    Cancelled Visits: -  No Show Visits: -    Time In: 2:45    Time Out: 335  Total Time: 50 minutes  Total Billable Time: 50 minutes    Precautions: standard    --------------------------------------------------------  Subjective     Erica reports she noticed she had a lot of leaking yesterday more than usual, and then more frequency today than she typically has. Didn't work out yesterday but had 2 cups of coffee so attributes symptoms potentially to this.    Chief concern of leakage, frequency, nocturia.     She was compliant with home exercise program.  Response to previous treatment: no adverse effect  Functional change: none reported    --------------------------------------------------------  Objective    Hip Range of Motion: bilateral IR limited      LE Strength Testing:   Hip Left Right   Flexion 5/5 5/5   Abduction     Adduction     Extension 3+/5 4-/5   External Rot 4 4+/5   Internal Rot 4-/5 4+/5       VAGINAL PELVIC FLOOR EXAM  Written consent obtained: 3/6/2025 for assessment  Exam updated: 3/6/2025      External Assessment  Introitus: WNL  Skin condition: WNL  Scarring: none   Sensation: WNL   Palpation: tenderness to palpation of none  Voluntary contraction: visible lift  Voluntary relaxation: visible drop  Bearing down: visible drop     Internal Assessment  Palpation: no tenderness reported   Sensation: able to localized pressure appropriately     Vaginal vault: WNL   Muscle Bulk: slightly increased tone   Muscle Power: 3/5  Muscle Endurance: 8 seconds  # Reps To Fatigue: 4                 Quality of contraction: slow rise, decreased hold, slow relaxation, and incomplete relaxation   Specificity: WNL   Coordination: tends to hold breath during PFM contration   Prolapse check: no tissue descent noted  Comments: patient able to isolate transverse abdominis and pelvic floor muscles co-contract      Treatment:    Erica received the following interventions during the treatment session:   Pt consents to pelvic floor muscle assessment and treatment.      Therapeutic exercises   [x]  Supine 90/90 + Diaphragmatic Breathing   [x]Wall squat with physioball  [x]Child's pose + lateral stretch    Manual Therapy     Neuromuscular Re-education   [x]Transverse abdominis + glute bridge 2x10  [x]AD ball squeeze + IR  1 side supine 2x10  [x]AD ball squezze + transverse abdominis 5 count exhale 3x10  [x]Feet press into wall + transverse abdominis zip up with breath  [x]Side-lying press + clam 2x10  [x]Transverse abdominis + Lat pull single arm  [x]Transverse abdominis + Lat pull single arm tandem stance  [x]Transverse abdominis + bilateral lat pull + march      Therapeutic activities  [x] Patient education - pt prognosis, PT plan of care, pelvic floor anatomy & function, and pelvic floor muscle assessment  [x] Pelvic floor assessment -   [x] HEP building/HEP review      Patient's spiritual, cultural, and educational needs considered and patient agreeable to plan of care and goals.     --------------------------------------------------------  Assessment & Plan   Assessment:  Today's session focused on exercise intervention to strengthen hips core & pelvic floor with focus on transverse abdominis with good patient response.     Erica is progressing well towards her goals.     Exercises & education were reviewed, and Erica was able to demonstrate understanding prior to the  end of the session, as needed. See 'Patient Instructions' for exercises/instructions provided.    Pt prognosis: good    Plan:  Patient will be continually assessed and progressed as appropriate to meet goals and restore function.     Goals:   Short Term Goals - Start:  3/6/2025    Expected End:  4/29/2025  Pt to demonstrate the knack independently to reduce incidence of stress urinary incontinence.  Pt to report >50% improvement in urine leakage   Pt to verbalize urge suppression strategies for improved control over urgency and urge urinary incontinence   Pt to be independent with introductory home exercise program      Long Term Goals - Start:  3/6/2025    Expected End:  5/29/2025   Pt to demonstrate bilateral hip strength of at least 4/5 for improved pelvic girdle support with load transfer   Pt to deny urinary incontinence to demonstrate improved pelvic floor coordination   Pt to demonstrate diaphragmatic breathing and pelvic floor lengthening independently for improved pelvic floor tone   Pt to be independent with advanced home exercise program      HOWIE LoganT

## 2025-03-21 NOTE — PATIENT INSTRUCTIONS
Standing March with Lat Pull    Regular Lat Pull    Side-lying clam + press      2-3 sets of 10 daily or every other day

## 2025-03-25 ENCOUNTER — CLINICAL SUPPORT (OUTPATIENT)
Dept: REHABILITATION | Facility: OTHER | Age: 60
End: 2025-03-25
Payer: COMMERCIAL

## 2025-03-25 DIAGNOSIS — M62.89 PELVIC FLOOR DYSFUNCTION: Primary | ICD-10-CM

## 2025-03-25 PROCEDURE — 97140 MANUAL THERAPY 1/> REGIONS: CPT | Mod: PN

## 2025-03-25 PROCEDURE — 97530 THERAPEUTIC ACTIVITIES: CPT | Mod: PN

## 2025-03-25 PROCEDURE — 97112 NEUROMUSCULAR REEDUCATION: CPT | Mod: PN

## 2025-03-25 NOTE — PROGRESS NOTES
Outpatient Rehab  Physical Therapy Visit    Patient Name: Erica Swann  MRN: [id number]  YOB: 1965  Today's Date: 3/25/2025    Therapy Diagnosis:   Encounter Diagnosis   Name Primary?    Pelvic floor dysfunction Yes       Referring Provider: Wlima Cr MD    Referring Provider Orders: Eval and Treat  Medical Diagnosis from Referral:  Urinary incontinence, mixed [N39.46]   Visit # / Visits Authorized: 3/12  Date of Evaluation:  3/6/2025   Insurance Authorization Period: expires 12/31/2025  Plan of Care Certification: 3/6/2025 to 5/29/2025    Cancelled Visits: -  No Show Visits: -  FOTO: 2/3 (3/25/1994)    Time In: 1:00   Time Out: 145   Total Time: 45 minutes  Total Billable Time: 45 minutes    Precautions: standard    --------------------------------------------------------  Subjective     Erica reports everything has been about the same, lately she's continued to leak more. With no noticeable changes to her routines. Notices it most when she's getting home bringing in groceries that she will sometimes have leakage, and that leakage usually occurs at home.      Has an ovary that shifted and could be having an impact on the bladder, or the fibroids that are on the bladder.    Chief concern of leakage, frequency.     She was compliant with home exercise program.  Response to previous treatment: no adverse effect  Functional change: none reported    --------------------------------------------------------  Objective    Hip Range of Motion: bilateral IR limited      LE Strength Testing:   Hip Left Right   Flexion 5/5 5/5   Abduction     Adduction     Extension 3+/5 4-/5   External Rot 4 4+/5   Internal Rot 4-/5 4+/5       VAGINAL PELVIC FLOOR EXAM  Consent obtained for assessment  Exam updated: 3/25/2025       External Assessment  Introitus: WNL  Skin condition: WNL  Scarring: none   Sensation: WNL   Palpation: tenderness to palpation of none  Voluntary contraction: visible lift  Voluntary  relaxation: visible drop  Bearing down: visible drop     Internal Assessment  Palpation: superficial transverse perineal  Sensation: able to localized pressure appropriately    Vaginal vault: WNL   Muscle Bulk: slightly increased tone   Muscle Power: 3/5  Muscle Endurance: 5 seconds  # Reps To Fatigue: 5                 Quality of contraction: slow rise, decreased hold, slow relaxation, and incomplete relaxation   Specificity: WNL   Coordination: with cuing patient improved breath use  Prolapse check: no tissue descent noted  Comments: patient able to isolate transverse abdominis and pelvic floor muscles co-contract      Treatment:    Erica received the following interventions during the treatment session:   Pt consents to pelvic floor muscle assessment and treatment.      Therapeutic exercises   []  Supine 90/90 + Diaphragmatic Breathing   []Wall squat with physioball  []Child's pose + lateral stretch    Manual Therapy   [x] Trigger point release to superficial transverse perineal    Neuromuscular Re-education   []Transverse abdominis + glute bridge 2x10  []AD ball squeeze + IR  1 side supine 2x10  []AD ball squezze + transverse abdominis 5 count exhale 3x10  []Feet press into wall + transverse abdominis zip up with breath  []Side-lying press + clam 2x10  []Transverse abdominis + Lat pull single arm  []Transverse abdominis + Lat pull single arm tandem stance  []Transverse abdominis + bilateral lat pull + march  [x]Kegel + Diaphragmatic Breathing + transverse abdominis   [x]Kegel + March      Therapeutic activities  [x] Patient education - pt prognosis, PT plan of care, pelvic floor anatomy & function, pelvic floor muscle assessment, consequences of elevated pelvic floor muscle tension, relationship between TrA & PFM, and pelvic floor relaxation techniques  [x] Pelvic floor assessment - Internal  [x] HEP building/HEP review      Patient's spiritual, cultural, and educational needs considered and patient agreeable  to plan of care and goals.     --------------------------------------------------------  Assessment & Plan   Assessment:  Today's session focused on refinement of pelvic floor muscle contraction technique and coordination training. Patient has delayed pelvic floor muscle contraction that takes time to fully ramp up. Patient also has difficulty coordinating timing of pelvic floor muscles contraction with hip movement during march, so regressed activity back to basic Kegel with improved performance.      Erica is progressing well towards her goals.     Exercises & education were reviewed, and Erica was able to demonstrate understanding prior to the end of the session, as needed. See 'Patient Instructions' for exercises/instructions provided.    Pt prognosis: good    Plan:  Patient will be continually assessed and progressed as appropriate to meet goals and restore function.     Goals:   Short Term Goals - Start:  3/6/2025    Expected End:  4/29/2025  Pt to demonstrate the knack independently to reduce incidence of stress urinary incontinence.  Pt to report >50% improvement in urine leakage   Pt to verbalize urge suppression strategies for improved control over urgency and urge urinary incontinence   Pt to be independent with introductory home exercise program      Long Term Goals - Start:  3/6/2025    Expected End:  5/29/2025   Pt to demonstrate bilateral hip strength of at least 4/5 for improved pelvic girdle support with load transfer   Pt to deny urinary incontinence to demonstrate improved pelvic floor coordination   Pt to demonstrate diaphragmatic breathing and pelvic floor lengthening independently for improved pelvic floor tone   Pt to be independent with advanced home exercise program      Phuong Heller DPT

## 2025-03-25 NOTE — PATIENT INSTRUCTIONS
Home Exercise Program: 03/25/2025    Kegels: Perform kegels on the exhale       Endurance Holds (Long holds)  Exhale as you Perform a long kegel (contract and LIFT the pelvic floor muscles as if you're trying to stop the stream of urine and passage of gas).    Make sure you're just using the internal muscles without holding your breath.  Let go of the kegel and relax your pelvic floor for 2-5 seconds.   Hold 5-6 seconds. Repeat 10 times, 1-2 sets per day.

## 2025-04-01 ENCOUNTER — PATIENT MESSAGE (OUTPATIENT)
Dept: REHABILITATION | Facility: OTHER | Age: 60
End: 2025-04-01
Payer: COMMERCIAL

## 2025-04-07 ENCOUNTER — CLINICAL SUPPORT (OUTPATIENT)
Dept: REHABILITATION | Facility: OTHER | Age: 60
End: 2025-04-07
Payer: COMMERCIAL

## 2025-04-07 DIAGNOSIS — M62.89 PELVIC FLOOR DYSFUNCTION: Primary | ICD-10-CM

## 2025-04-07 PROCEDURE — 97530 THERAPEUTIC ACTIVITIES: CPT | Mod: PN

## 2025-04-07 PROCEDURE — 97112 NEUROMUSCULAR REEDUCATION: CPT | Mod: PN

## 2025-04-07 NOTE — PATIENT INSTRUCTIONS
Supine bent knee fall out (BKFO), 2 sets of 10  - Keep hips level as you Exhale squeeze pelvic floor and gently drop one hip/knee to the side (but only as far as you can control)  - Inhale again to rest, do 10 reps one side the switch to the other  *Don't hold your breath  *Imagine disconnecting hip movement from the rest of the body, so your back doesn't move while the leg is moving.      Make sure you're breathing with jumping movements.     Pay attention to when specifically you have the leakage happening what time in the class, and which exercise specifically.    Urge Suppression  1) Stop what you're doing and put both feet on the ground  2) Perform 5-10 quick toe squeezes you will fell pelvic floor also engage,  3) Take a deep breath and relax your pelvic floor   - Focusing on relaxing the jaw or belly might work better  4) Assess if the urge was real   - How long has it been since your last void?   - Does the urgency go away or not?  5) Proceed to the bathroom, if needed    *You may need to repeat the steps if urgency arises again on the way to the bathroom  *Try to suppress urgency when it's of a medium intensity as it's harder to suppress when urgency is severe  *Identify your triggers (putting key into the front door, seeing the toilet, arranging clothing for voiding)

## 2025-04-07 NOTE — PROGRESS NOTES
Outpatient Rehab  Physical Therapy Visit    Patient Name: Erica Swann  MRN: [id number]  YOB: 1965  Today's Date: 4/7/2025    Therapy Diagnosis:   Encounter Diagnosis   Name Primary?    Pelvic floor dysfunction Yes         Referring Provider: Wilma Cr MD    Referring Provider Orders: Eval and Treat  Medical Diagnosis from Referral:  Urinary incontinence, mixed [N39.46]   Visit # / Visits Authorized: 4/12  Date of Evaluation:  3/6/2025   Insurance Authorization Period: expires 12/31/2025  Plan of Care Certification: 3/6/2025 to 5/29/2025    FOTO: 2/3 (3/25/1994)    Time In: 1:15  Time Out: 200   Total Time: 45 minutes  Total Billable Time: 45 minutes    Precautions: standard    --------------------------------------------------------  Subjective     Erica reports everything has been about the same, notices she is still having some leakage with jumping and bouncing. Sometimes she doesn't leak much at all, and doesn't happen throughout the class. When it happens she does feel it and it's usually just a few drops.    With [employer] does cardio, weights, and core work.     [facility].     Chief concern of leakage, frequency.     She was compliant with home exercise program.  Response to previous treatment: no adverse effect  Functional change: none reported    --------------------------------------------------------  Objective    Hip Range of Motion: bilateral IR limited      LE Strength Testing:   Hip Left Right   Flexion 5/5 5/5   Abduction     Adduction     Extension 3+/5 4-/5   External Rot 4 4+/5   Internal Rot 4-/5 4+/5       VAGINAL PELVIC FLOOR EXAM  Consent obtained for assessment  Exam updated: 4/7/2025    External Assessment  Introitus: WNL  Skin condition: WNL  Scarring: none   Sensation: WNL   Palpation: tenderness to palpation of none  Voluntary contraction: visible lift  Voluntary relaxation: visible drop  Bearing down: visible drop      Internal Assessment  Palpation: superficial transverse perineal  Sensation: able to localized pressure appropriately    Vaginal vault: WNL   Muscle Bulk: slightly increased tone   Muscle Power: 3/5  Muscle Endurance: 3 seconds  # Reps To Fatigue: 10                 Quality of contraction: slow rise, decreased hold, slow relaxation, and incomplete relaxation   Specificity: WNL   Coordination: with cuing patient improved breath use  Prolapse check: no tissue descent noted  Comments: patient able to isolate transverse abdominis and pelvic floor muscles co-contract      Treatment:    Erica received the following interventions during the treatment session:   Pt consents to pelvic floor muscle assessment and treatment.      Therapeutic exercises   []  Supine 90/90 + Diaphragmatic Breathing   []Wall squat with physioball  []Child's pose + lateral stretch    Manual Therapy   [] Trigger point release to superficial transverse perineal    Neuromuscular Re-education   []Transverse abdominis + glute bridge 2x10  []AD ball squeeze + IR  1 side supine 2x10  []AD ball squezze + transverse abdominis 5 count exhale 3x10  []Feet press into wall + transverse abdominis zip up with breath  []Side-lying press + clam 2x10  []Transverse abdominis + Lat pull single arm  []Transverse abdominis + Lat pull single arm tandem stance  []Transverse abdominis + bilateral lat pull + march  [x]Kegel + Diaphragmatic Breathing + transverse abdominis   []Kegel + March  [x]Kegel + BKFO         Therapeutic activities  [x] Patient education - pt prognosis, PT plan of care, pelvic floor anatomy & function, pelvic floor muscle assessment, consequences of elevated pelvic floor muscle tension, relationship between TrA & PFM, and pelvic floor relaxation techniques, urge suppression strategies  [x] Pelvic floor assessment - Internal  [x] HEP building/HEP review      Patient's spiritual, cultural, and educational needs considered and patient agreeable to plan  of care and goals.     --------------------------------------------------------  Assessment & Plan   Assessment:  Provided patient with tactile input to pelvic floor muscles to assist with recruitment, patient was able to demo increased recruitment of pelvic floor muscles when using improved breath mechanics and focus. Included additional coordination training with good patient performance. Patient provided education on urge supression strategies with ability to verbalize understanding.     Erica is progressing well towards her goals.     Exercises & education were reviewed, and Erica was able to demonstrate understanding prior to the end of the session, as needed. See 'Patient Instructions' for exercises/instructions provided.    Pt prognosis: good    Plan:  Patient will be continually assessed and progressed as appropriate to meet goals and restore function.     Goals:   Short Term Goals - Start:  3/6/2025    Expected End:  4/29/2025  Pt to demonstrate the knack independently to reduce incidence of stress urinary incontinence.  Pt to report >50% improvement in urine leakage   Pt to verbalize urge suppression strategies for improved control over urgency and urge urinary incontinence   Pt to be independent with introductory home exercise program      Long Term Goals - Start:  3/6/2025    Expected End:  5/29/2025   Pt to demonstrate bilateral hip strength of at least 4/5 for improved pelvic girdle support with load transfer   Pt to deny urinary incontinence to demonstrate improved pelvic floor coordination   Pt to demonstrate diaphragmatic breathing and pelvic floor lengthening independently for improved pelvic floor tone   Pt to be independent with advanced home exercise program      Phuong Heller DPT

## 2025-04-23 ENCOUNTER — CLINICAL SUPPORT (OUTPATIENT)
Dept: REHABILITATION | Facility: OTHER | Age: 60
End: 2025-04-23
Payer: COMMERCIAL

## 2025-04-23 DIAGNOSIS — M62.89 PELVIC FLOOR DYSFUNCTION: Primary | ICD-10-CM

## 2025-04-23 PROCEDURE — 97112 NEUROMUSCULAR REEDUCATION: CPT | Mod: PN | Performed by: PHYSICAL THERAPIST

## 2025-04-23 PROCEDURE — 97140 MANUAL THERAPY 1/> REGIONS: CPT | Mod: PN | Performed by: PHYSICAL THERAPIST

## 2025-04-23 PROCEDURE — 97530 THERAPEUTIC ACTIVITIES: CPT | Mod: PN | Performed by: PHYSICAL THERAPIST

## 2025-04-24 NOTE — PROGRESS NOTES
Outpatient Rehab  Physical Therapy Visit    Patient Name: Erica Swann  MRN: [id number]  YOB: 1965  Today's Date: 4/23/2025    Therapy Diagnosis:   Encounter Diagnosis   Name Primary?    Pelvic floor dysfunction Yes         Referring Provider: Wilma Cr MD    Referring Provider Orders: Eval and Treat  Medical Diagnosis from Referral:  Urinary incontinence, mixed [N39.46]   Visit # / Visits Authorized: 5/12  Date of Evaluation:  3/6/2025   Insurance Authorization Period: expires 12/31/2025  Plan of Care Certification: 3/6/2025 to 5/29/2025    FOTO: 2/3 (3/25/1994)    Time In: 12:35  Time Out: 115   Total Time: 40 minutes  Total Billable Time: 40 minutes    Precautions: standard    --------------------------------------------------------  Subjective     Erica reports went to [facility] last week and had more leakage because she is walking around a lot with little droplets coming out. Reports wearing poise #2. She didn't soak through a pad but was close during this time. Reports that she was still trying to go every 2-4 hours while she was out and about. Reports that she didn't do as much with traveling.    She was compliant with home exercise program.  Response to previous treatment: no adverse effect  Functional change: none reported    --------------------------------------------------------  Objective    Hip Range of Motion: bilateral IR limited      LE Strength Testing:   Hip Left Right   Flexion 5/5 5/5   Abduction     Adduction     Extension 3+/5 4-/5   External Rot 4 4+/5   Internal Rot 4-/5 4+/5       VAGINAL PELVIC FLOOR EXAM  Consent obtained for assessment  Exam updated: 4/7/2025    External Assessment  Introitus: WNL  Skin condition: WNL  Scarring: none   Sensation: WNL   Palpation: tenderness to palpation of none  Voluntary contraction: visible lift  Voluntary relaxation: visible drop  Bearing down: visible drop     Internal Assessment  Palpation: superficial transverse  perineal  Sensation: able to localized pressure appropriately    Vaginal vault: WNL   Muscle Bulk: slightly increased tone   Muscle Power: 3/5  Muscle Endurance: 3 seconds  # Reps To Fatigue: 10                 Quality of contraction: slow rise, decreased hold, slow relaxation, and incomplete relaxation   Specificity: WNL   Coordination: with cuing patient improved breath use  Prolapse check: no tissue descent noted  Comments: patient able to isolate transverse abdominis and pelvic floor muscles co-contract      Treatment:    Erica received the following interventions during the treatment session:   Pt consents to pelvic floor muscle assessment and treatment.      Therapeutic exercises   []  Supine 90/90 + Diaphragmatic Breathing   []Wall squat with physioball  []Child's pose + lateral stretch    Manual Therapy   [] Trigger point release to superficial transverse perineal  [x]Lateral hip distraction    Neuromuscular Re-education   []Transverse abdominis + glute bridge 2x10  []AD ball squeeze + IR  1 side supine 2x10  []AD ball squezze + transverse abdominis 5 count exhale 3x10  []Feet press into wall + transverse abdominis zip up with breath  []Side-lying press + clam 2x10  []Transverse abdominis + Lat pull single arm  []Transverse abdominis + Lat pull single arm tandem stance  []Transverse abdominis + bilateral lat pull + march  [x]Kegel + Diaphragmatic Breathing + transverse abdominis   []Kegel + March  [x]Kegel + BKFO         Therapeutic activities  [x] Patient education - pt prognosis, PT plan of care, pelvic floor anatomy & function, pelvic floor muscle assessment, consequences of elevated pelvic floor muscle tension, relationship between TrA & PFM, and pelvic floor relaxation techniques, urge suppression strategies  [x] Pelvic floor assessment - Internal  [x] HEP building/HEP review      Patient's spiritual, cultural, and educational needs considered and patient agreeable to plan of care and goals.      --------------------------------------------------------  Assessment & Plan   Assessment:  Today's session focused on verbal cues & tactile cues to improve patient's coordination of transverse abdominis pelvic floor & hip muscular with internal feedback provided. Patient initially more oblique dominant but able to reduce over-activation.      Erica is progressing well towards her goals.     Exercises & education were reviewed, and Erica was able to demonstrate understanding prior to the end of the session, as needed. See 'Patient Instructions' for exercises/instructions provided.    Pt prognosis: good    Plan:  Patient will be continually assessed and progressed as appropriate to meet goals and restore function.     Goals:   Short Term Goals - Start:  3/6/2025    Expected End:  4/29/2025  Pt to demonstrate the knack independently to reduce incidence of stress urinary incontinence.  Pt to report >50% improvement in urine leakage   Pt to verbalize urge suppression strategies for improved control over urgency and urge urinary incontinence   Pt to be independent with introductory home exercise program      Long Term Goals - Start:  3/6/2025    Expected End:  5/29/2025   Pt to demonstrate bilateral hip strength of at least 4/5 for improved pelvic girdle support with load transfer   Pt to deny urinary incontinence to demonstrate improved pelvic floor coordination   Pt to demonstrate diaphragmatic breathing and pelvic floor lengthening independently for improved pelvic floor tone   Pt to be independent with advanced home exercise program      Phuong Heller DPT

## 2025-05-05 ENCOUNTER — CLINICAL SUPPORT (OUTPATIENT)
Dept: REHABILITATION | Facility: OTHER | Age: 60
End: 2025-05-05
Payer: COMMERCIAL

## 2025-05-05 DIAGNOSIS — M62.89 PELVIC FLOOR DYSFUNCTION: Primary | ICD-10-CM

## 2025-05-05 PROCEDURE — 97530 THERAPEUTIC ACTIVITIES: CPT | Mod: PN

## 2025-05-05 PROCEDURE — 97112 NEUROMUSCULAR REEDUCATION: CPT | Mod: PN

## 2025-05-05 PROCEDURE — 97140 MANUAL THERAPY 1/> REGIONS: CPT | Mod: PN

## 2025-05-05 NOTE — PROGRESS NOTES
Outpatient Rehab  Physical Therapy Visit    Patient Name: Erica Swann  MRN: [id number]  YOB: 1965  Today's Date: 5/5/2025    Therapy Diagnosis:   Encounter Diagnosis   Name Primary?    Pelvic floor dysfunction Yes         Referring Provider: Wilma Cr MD    Referring Provider Orders: Eval and Treat  Medical Diagnosis from Referral:  Urinary incontinence, mixed [N39.46]   Visit # / Visits Authorized: 6/12  Date of Evaluation:  3/6/2025   Insurance Authorization Period: expires 12/31/2025  Plan of Care Certification: 3/6/2025 to 5/29/2025    FOTO: 2/3 (3/25/1994)    Time In: 1:18  Time Out: 1:59   Total Time: 41 minutes  Total Billable Time: 41 minutes    Precautions: standard    --------------------------------------------------------  Subjective     Erica reports Notices urge has been better, but leakage is still happening. Noticed after [employer] she had increased urine in her pad attributes this to drinking alcohol & coffee as well as increased walking around. She never has leakage when she walks her dog, even though she goes about a mile, so she think it could relate to increased walking in this different environments.     She's been good about doing her exercises but needs to get a yoga block.    Tries to use knack doesn't feel like it always works.    Impact 2 feet down she leaks, or sometimes urge incontinence in the driveway.    Reports she's been constipated her entire life, and that her hemorrhoids are worse right now. Feels like no matter how much fiber she eats. Notices coffee helps a bit with the constipation.     She was compliant with home exercise program.  Response to previous treatment: no adverse effect  Functional change: none reported    --------------------------------------------------------  Objective    Hip Range of Motion: bilateral IR limited      LE Strength Testing:   Hip Left Right   Flexion 5/5 5/5   Abduction     Adduction     Extension 3+/5 4-/5    External Rot 4 4+/5   Internal Rot 4-/5 4+/5       VAGINAL PELVIC FLOOR EXAM  Consent obtained for assessment  Exam updated: 4/7/2025    External Assessment  Introitus: WNL  Skin condition: WNL  Scarring: none   Sensation: WNL   Palpation: tenderness to palpation of none  Voluntary contraction: visible lift  Voluntary relaxation: visible drop  Bearing down: visible drop     Internal Assessment  Palpation: superficial transverse perineal  Sensation: able to localized pressure appropriately    Vaginal vault: WNL   Muscle Bulk: slightly increased tone   Muscle Power: 3/5  Muscle Endurance: 3 seconds  # Reps To Fatigue: 10                 Quality of contraction: slow rise, decreased hold, slow relaxation, and incomplete relaxation   Specificity: WNL   Coordination: with cuing patient improved breath use  Prolapse check: no tissue descent noted  Comments: patient able to isolate transverse abdominis and pelvic floor muscles co-contract      Treatment:    Erica received the following interventions during the treatment session:   Pt consents to pelvic floor muscle assessment and treatment.      Therapeutic exercises   [] Supine 90/90 + Diaphragmatic Breathing   []Wall squat with physioball  []Child's pose + lateral stretch    Manual Therapy   [] Trigger point release to superficial transverse perineal  []Lateral hip distraction  [x] Long axis hip distraction COLIN    Neuromuscular Re-education   []Transverse abdominis + glute bridge 2x10  []AD ball squeeze + IR  1 side supine 2x10  []AD ball squezze + transverse abdominis 5 count exhale 3x10  []Feet press into wall + transverse abdominis zip up with breath  []Side-lying press + clam 2x10  []Transverse abdominis + Lat pull single arm  []Transverse abdominis + Lat pull single arm tandem stance  []Transverse abdominis + bilateral lat pull + march  []Kegel + Diaphragmatic Breathing + transverse abdominis   []Kegel + March  []Kegel + BKFO   [x]Supine IR + AD with yoga block  3x10  [x] Side-lying reverse clam 3x10  [x]Side-lying clam 3x10      Therapeutic activities  [x] Patient education - pt prognosis, PT plan of care, pelvic floor anatomy & function, pelvic floor muscle assessment, consequences of elevated pelvic floor muscle tension, relationship between TrA & PFM, and pelvic floor relaxation techniques, urge suppression strategies  [x] Pelvic floor assessment - Internal  [x] HEP building/HEP review  [x]Bladder irritant education along with Prelief recommendations      Patient's spiritual, cultural, and educational needs considered and patient agreeable to plan of care and goals.     --------------------------------------------------------  Assessment & Plan   Assessment:  Today's session emphasized improving hip IR Range of Motion, with good improvement observed and felt after long axis distraction which was then activated using stabilization drills for both agonist and antagonist muscles with good patient performance.      Erica is progressing well towards her goals.     Exercises & education were reviewed, and Erica was able to demonstrate understanding prior to the end of the session, as needed. See 'Patient Instructions' for exercises/instructions provided.    Pt prognosis: good    Plan:  Patient will be continually assessed and progressed as appropriate to meet goals and restore function.     Goals:   Short Term Goals - Start:  3/6/2025    Expected End:  4/29/2025  Pt to demonstrate the knack independently to reduce incidence of stress urinary incontinence. MET  Pt to report >50% improvement in urine leakage. ~5% improvement  Pt to verbalize urge suppression strategies for improved control over urgency and urge urinary incontinence   Pt to be independent with introductory home exercise program      Long Term Goals - Start:  3/6/2025    Expected End:  5/29/2025   Pt to demonstrate bilateral hip strength of at least 4/5 for improved pelvic girdle support with load transfer    Pt to deny urinary incontinence to demonstrate improved pelvic floor coordination   Pt to demonstrate diaphragmatic breathing and pelvic floor lengthening independently for improved pelvic floor tone   Pt to be independent with advanced home exercise program.     Phuong Heller, DPT

## 2025-05-05 NOTE — PATIENT INSTRUCTIONS
Here are your HW updates for the next 2 weeks.     Try belly big belly hard technique with bowel movements. Inhale fill the belly exhale keep the belly firm while pushing and letting the anus open, keep pelvic floor muscles relaxed do not kegel.     https://www.Upverterube.com/[email or web address]?v=[email or web address]     https://www.Upverterube.com/watch?v=87zqL5hbleV    ?  ? Clams Exercise Demonstration  Clams are a strengthening exercise performed with a rubber band for added resistance. While sitting, position the band above the knees. Lie down on the floor with knees bent at a 45 degree angle, facing the wall. Move the top leg away from the bottom leg to open like a clam. Repeat the motion on the other side. Watch as our physical therapist ...  [email or web address]   ?  Draw in belly as you exhale pressing into table & lifting knee     [email or web address]  Prelief  Its an acid world out there. Many people start their day with a cup of coffee or orange juice. Then the rest filled with similarly acidic foods and drinks like pizza, salad dressing, tomato sauce, soda, beer, wine and more.  FanSnap   You can purchase at "[employer] etc.  Purpose:  Prelief is designed to reduce the acid in foods and beverages, which can help minimize discomfort for those sensitive to acidic substances.  It is often recommended for individuals experiencing bladder discomfort or heartburn triggered by acidic foods.  How to Use:  Timing:  Take Prelief right as you begin eating or drinking.  This allows the product to neutralize the acid before it causes discomfort.  Dosage:  The standard recommendation is to take 2 caplets with each meal, snack, or beverage.  You can take more caplets if needed, especially when consuming larger portions of acidic foods.  Form:  Prelief is available in caplet form or powder form the powder is to be mixed into the acidic food or drink.  Frequency:  For best results, Prelief can be used with every acidic food or  "beverage, multiple times a day.  Important Considerations:  "Medical Food" Designation:  Prelief is classified as a "medical food," which allows for claims regarding the dietary management of conditions like interstitial cystitis (IC).  Ingredients:  The active ingredient is calcium glycerophosphate.  Safety:  While generally safe, it's advisable to consult a healthcare professional if you:  Have a history of kidney stones or kidney disease.  Have parathyroid gland problems.  Have high calcium levels in your blood (hypercalcemia).  Are taking other medications, as calcium can interfere with the absorption of certain drugs.  "

## 2025-05-15 ENCOUNTER — PATIENT MESSAGE (OUTPATIENT)
Dept: ENDOCRINOLOGY | Facility: CLINIC | Age: 60
End: 2025-05-15
Payer: COMMERCIAL

## 2025-05-15 DIAGNOSIS — E05.90 SUBCLINICAL HYPERTHYROIDISM: Primary | ICD-10-CM

## 2025-05-16 ENCOUNTER — PATIENT MESSAGE (OUTPATIENT)
Dept: REHABILITATION | Facility: HOSPITAL | Age: 60
End: 2025-05-16
Payer: COMMERCIAL

## 2025-05-20 ENCOUNTER — LAB VISIT (OUTPATIENT)
Dept: LAB | Facility: HOSPITAL | Age: 60
End: 2025-05-20
Attending: INTERNAL MEDICINE
Payer: COMMERCIAL

## 2025-05-20 ENCOUNTER — CLINICAL SUPPORT (OUTPATIENT)
Dept: REHABILITATION | Facility: HOSPITAL | Age: 60
End: 2025-05-20
Payer: COMMERCIAL

## 2025-05-20 DIAGNOSIS — M62.89 PELVIC FLOOR DYSFUNCTION: Primary | ICD-10-CM

## 2025-05-20 DIAGNOSIS — E05.90 SUBCLINICAL HYPERTHYROIDISM: ICD-10-CM

## 2025-05-20 LAB
T4 FREE SERPL-MCNC: 0.83 NG/DL (ref 0.71–1.51)
TSH SERPL-ACNC: 0.15 UIU/ML (ref 0.4–4)

## 2025-05-20 PROCEDURE — 97530 THERAPEUTIC ACTIVITIES: CPT | Mod: PO

## 2025-05-20 PROCEDURE — 36415 COLL VENOUS BLD VENIPUNCTURE: CPT

## 2025-05-20 PROCEDURE — 84443 ASSAY THYROID STIM HORMONE: CPT

## 2025-05-20 PROCEDURE — 84439 ASSAY OF FREE THYROXINE: CPT

## 2025-05-20 PROCEDURE — 97112 NEUROMUSCULAR REEDUCATION: CPT | Mod: PO

## 2025-05-20 NOTE — PROGRESS NOTES
Outpatient Rehab  Physical Therapy Visit    Patient Name: Erica Swann  MRN: [id number]  YOB: 1965  Today's Date: 5/20/2025    Therapy Diagnosis:   Encounter Diagnosis   Name Primary?    Pelvic floor dysfunction Yes       Referring Provider: Wilma Cr MD    Referring Provider Orders: Eval and Treat  Medical Diagnosis from Referral:  Urinary incontinence, mixed [N39.46]   Visit # / Visits Authorized: 6/12  Date of Evaluation:  3/6/2025   Insurance Authorization Period: expires 12/31/2025  Plan of Care Certification: 3/6/2025 to 5/29/2025    FOTO: 2/3     Time In: 1:00  Time Out: 1:45   Total Time: 45 minutes  Total Billable Time: 45 minutes    Precautions: standard    --------------------------------------------------------  Subjective     Erica reports she feels like things have been a bit better, and she's doing the exercises daily. She has moments where after 2 hours she really has to go but believes this is mostly mental. She has been modifying slightly in her exercise class to help reduce leakage, but also reports that this has been much better overall.    Current routine: KAREN Roper March, block IR,      She was compliant with home exercise program.  Response to previous treatment: no adverse effect  Functional change: none reported    --------------------------------------------------------  Objective    Hip Range of Motion: bilateral IR limited      LE Strength Testing:   Hip Left Right   Flexion 5/5 5/5   Abduction     Adduction     Extension 3+/5 4-/5   External Rot 4 4+/5   Internal Rot 4-/5 4+/5       VAGINAL PELVIC FLOOR EXAM  Consent obtained for assessment  Exam updated: 5/20/2025      External Assessment  Introitus: WNL  Skin condition: WNL  Scarring: none   Sensation: WNL   Palpation: tenderness to palpation of none  Voluntary contraction: visible lift  Voluntary relaxation: visible drop  Bearing down: visible drop     Internal Assessment  Palpation: none  Sensation:  able to localized pressure appropriately    Vaginal vault: WNL   Muscle Bulk: slightly increased tone   Muscle Power: 3/5  Muscle Endurance: 6 seconds  # Reps To Fatigue: 10 (slight strength decrease around rep 6)             Quality of contraction: slow rise  Specificity: WNL   Coordination: with cuing patient improved breath use  Prolapse check: no tissue descent noted  Comments: improved & complete pelvic floor muscles relaxation      Treatment:    Erica received the following interventions during the treatment session:   Pt consents to pelvic floor muscle assessment and treatment.      Therapeutic exercises   [] Supine 90/90 + Diaphragmatic Breathing   []Wall squat with physioball  []Child's pose + lateral stretch    Manual Therapy   [] Trigger point release to superficial transverse perineal  []Lateral hip distraction  [] Long axis hip distraction COLIN    Neuromuscular Re-education   []Transverse abdominis + glute bridge 2x10  []AD ball squeeze + IR  1 side supine 2x10  []AD ball squezze + transverse abdominis 5 count exhale 3x10  []Feet press into wall + transverse abdominis zip up with breath  []Side-lying press + clam 2x10  []Transverse abdominis + Lat pull single arm  []Transverse abdominis + Lat pull single arm tandem stance  []Transverse abdominis + bilateral lat pull + march  [x]Kegel + Diaphragmatic Breathing + transverse abdominis   [x]Kegel + March  [x]Kegel + BKFO   [x]Kegel + Bridge  [x]Kegel + TA Bridge + yellow theraband Double leg & single leg  []Supine IR + AD with yoga block 3x10  [] Side-lying reverse clam 3x10  []Side-lying clam 3x10      Therapeutic activities  [x] Patient education - pt prognosis, PT plan of care, pelvic floor anatomy & function, pelvic floor muscle assessment, consequences of elevated pelvic floor muscle tension, relationship between TrA & PFM, and pelvic floor relaxation techniques, urge suppression strategies--roll for control + pacing  [x] Pelvic floor assessment -  Internal  [x] HEP building/HEP review  []Bladder irritant education along with Prelief recommendations      Patient's spiritual, cultural, and educational needs considered and patient agreeable to plan of care and goals.     --------------------------------------------------------  Assessment & Plan   Assessment:  Reviewed urge supression including pacing & roll for control, updated pelvic floor muscles assessment with patient making improvements to both strength and endurance of pelvic floor muscles, worked on pelvic floor muscles coordination training with improved patient performance throughout.     Erica is progressing well towards her goals.     Exercises & education were reviewed, and Erica was able to demonstrate understanding prior to the end of the session, as needed. See 'Patient Instructions' for exercises/instructions provided.    Pt prognosis: good    Plan:  Patient will be continually assessed and progressed as appropriate to meet goals and restore function.     Goals:   Short Term Goals - Start:  3/6/2025    Expected End:  4/29/2025  Pt to demonstrate the knack independently to reduce incidence of stress urinary incontinence. MET  Pt to report >50% improvement in urine leakage. ~5% improvement  Pt to verbalize urge suppression strategies for improved control over urgency and urge urinary incontinence   Pt to be independent with introductory home exercise program      Long Term Goals - Start:  3/6/2025    Expected End:  5/29/2025   Pt to demonstrate bilateral hip strength of at least 4/5 for improved pelvic girdle support with load transfer   Pt to deny urinary incontinence to demonstrate improved pelvic floor coordination   Pt to demonstrate diaphragmatic breathing and pelvic floor lengthening independently for improved pelvic floor tone   Pt to be independent with advanced home exercise program.     Phuong Heller, HOWIET

## 2025-05-23 ENCOUNTER — PATIENT MESSAGE (OUTPATIENT)
Dept: ENDOCRINOLOGY | Facility: CLINIC | Age: 60
End: 2025-05-23

## 2025-05-23 ENCOUNTER — OFFICE VISIT (OUTPATIENT)
Dept: ENDOCRINOLOGY | Facility: CLINIC | Age: 60
End: 2025-05-23
Payer: COMMERCIAL

## 2025-05-23 VITALS — BODY MASS INDEX: 21.16 KG/M2 | HEIGHT: 62 IN | WEIGHT: 115 LBS

## 2025-05-23 DIAGNOSIS — E05.90 SUBCLINICAL HYPERTHYROIDISM: Primary | ICD-10-CM

## 2025-05-23 DIAGNOSIS — M85.80 OSTEOPENIA, UNSPECIFIED LOCATION: ICD-10-CM

## 2025-05-23 DIAGNOSIS — E04.1 THYROID NODULE: ICD-10-CM

## 2025-05-23 NOTE — PATIENT INSTRUCTIONS
We will continue to watch your thyroid function and the nodule over time  We will plan for visit with a TSH before in May or 2026    You will be due for a repeat ultrasound in 11/2026    I would like to see you for a follow-up visit in one year which will be in May of 2026. My schedule for then will open at the beginning of January 2026 so please set yourself a reminder to schedule a visit when it opens. The spots fill quickly so please do this at the beginning of the month. You can reach out to us for help with scheduling or book yourself on the portal.

## 2025-05-23 NOTE — PROGRESS NOTES
Erica Swann is a 59 y.o. 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, thyroid nodule    The patient location is: [place]  The chief complaint leading to consultation is:   Chief Complaint   Patient presents with    Thyroid Nodule    Hyperthyroidism       Visit type: audiovisual    Face to Face time with patient: 8 minutes  12 minutes of total time spent on the encounter, which includes face to face time and non-face to face time preparing to see the patient (eg, review of tests), Obtaining and/or reviewing separately obtained history, Documenting clinical information in the electronic or other health record, Independently interpreting results (not separately reported) and communicating results to the patient/family/caregiver, or Care coordination (not separately reported).    Each patient to whom he or she provides medical services by telemedicine is:  (1) informed of the relationship between the physician and patient and the respective role of any other health care provider with respect to management of the patient; and (2) notified that he or she may decline to receive medical services by telemedicine and may withdraw from such care at any time.      History of Present Illness  Thyroid nodule  Subclinical hyperthyroidism  Found on exam by internist    Had US at [facility] with left lobe nodule measuring 2.6 x 1.1 x 1.2 cm- oval circumscribed hypoechoic solid nodule with heterogeneous parenchyma and increased vascularity  TIRADS 5 and FNA recommended    Labs with subclinical hyperthyroidism as below beginning in June 2022. Denies prior history of thyroid disease and previous TSHs all normal    Uptake and scan demonstrated that this was a toxic nodule however given high risk features we obtained FNA which was benign    FNA 11/2022 of left lobe nodule benign   Repeat US 11/2023 stable    We treated briefly with methimazole to normalize thyroid function prior to FNA but once this was  complete stopped as she otherwise did not have indication for treatment  At last visit retried methimazole but then developed joint pain so now off  Does not feel any different on or off treatment. No symptoms of hyperthyroidism    Does have some menopause symptoms including joint pain and hot flashes but helped by HRT    We have discussed option of ZHOU for definitive treatment but as no pressing need now have held off  Lab Results   Component Value Date    TSH 0.154 (L) 05/20/2025         Risk Factors for Thyroid Cancer:  Hx of External Beam Radiation:denies  Family Hx of Thyroid Cancer: denies    Denies rapid neck enlargement, difficulty swallowing, SOB, or hoarseness.     Father maybe with nodule    Thyroid uptake and scan 7/2022:  FINDINGS:  The 24 hour uptake is normal at 14.8 % (normal 10-30%).   Thyroid gland is normal shape and size.  There is focally increased uptake at the pole of the left thyroid lobe with relative suppression of uptake in the remainder of the gland.     Impression:   1. Normal 24 hour radioiodine uptake by the thyroid.  2. 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   Left lobe measures 5.2 x 1.7 x 2.4 cm.   Right nodules:   0.8 x 0.4 x 0.5 cm.   0.8 x 0.2 x 0.4 cm.     Left nodules:   0.8 x 0.4 x 0.8 cm.   2.6 x 1.0 x 1.4 cm.     No adenopathy seen.   Impression: Left lower pole nodule was previously biopsied.  Remaining nodules do not meet criteria for biopsy.  Follow-up ultrasound is recommended in 2 years.    On HRT prescribed by gyn  DXA 7/2024 with osteopenia, stable compared to prior          Current Outpatient Medications:     calcium carbonate/vitamin D3 (CALCIUM WITH VITAMIN D ORAL), Take by mouth., Disp: , Rfl:     EScitalopram oxalate (LEXAPRO) 5 MG Tab, , Disp: , Rfl:     estradioL (ESTRACE) 0.01 % (0.1 mg/gram) vaginal cream,  0.5 grams with applicator or dime-sized amount with finger in vagina nightly x 2 weeks, then twice a week thereafter, Disp: 42.5 g, Rfl: 11    estradioL (VIVELLE-DOT) 0.1 mg/24 hr PTSW, Place 1 patch onto the skin twice a week., Disp: 8 patch, Rfl: 11    gabapentin (NEURONTIN) 100 MG capsule, Take by mouth., Disp: , Rfl:     irbesartan (AVAPRO) 150 MG tablet, Take 1 tablet by mouth every evening., Disp: , Rfl:     loratadine (CLARITIN) 10 mg tablet, Take 10 mg by mouth once daily., Disp: , Rfl:     mirabegron (MYRBETRIQ) 50 mg Tb24, Take 1 tablet (50 mg total) by mouth once daily., Disp: 30 each, Rfl: 11    progesterone (PROMETRIUM) 200 MG capsule, Take 1 capsule (200 mg total) by mouth nightly. Cycle day 15-28, Disp: 90 capsule, Rfl: 3    tretinoin (RETIN-A) 0.025 % cream, , Disp: , Rfl:     triamcinolone acetonide (NASACORT NASL), by Nasal route., Disp: , Rfl:     betamethasone dipropionate 0.05 % cream, Apply topically 2 (two) times daily. (Patient not taking: Reported on 5/23/2025), Disp: , Rfl:     ROS as above    Objective:     There were no vitals filed for this visit.    Wt Readings from Last 3 Encounters:   05/23/25 1030 52.2 kg (115 lb)   02/12/25 1504 54.3 kg (119 lb 11.4 oz)   12/20/24 1515 53.8 kg (118 lb 9.7 oz)         Body mass index is 21.03 kg/m².    Labs    Chemistry        Component Value Date/Time     08/13/2024 1012     06/26/2024 1515    K 4.1 08/13/2024 1012    K 3.8 06/26/2024 1515     06/26/2024 1515    CO2 30 08/13/2024 1012    CO2 27 06/26/2024 1515    BUN 14 08/13/2024 1012    BUN 13 06/26/2024 1515    CREATININE 0.63 08/13/2024 1012    CREATININE 0.9 06/26/2024 1515    GLU 89 08/13/2024 1012    GLU 92 06/26/2024 1515        Component Value Date/Time    CALCIUM 8.9 08/13/2024 1012    CALCIUM 9.2 06/26/2024 1515    ALKPHOS 41 08/13/2024 1012    ALKPHOS 45 (L) 10/11/2022 1405    AST 17 08/13/2024 1012    AST 21 10/11/2022 1405    ALT 12 08/13/2024 1012    ALT 19  10/11/2022 1405    BILITOT 0.4 08/13/2024 1012    BILITOT 0.3 10/11/2022 1405              Assessment and Plan     Subclinical hyperthyroidism  In past did trials of methimazole which she did not tolerate well and now off  Currently without pressing reason for treatment but if did need would chose ZHOU particularly given etiology toxic nodule  Discussed possibility of hypothyroidism after ZHOU. She would like to avoid need for thyroid hormone as long as possible  As now without pressing need for treatment ok to hold  Continue to watch TSH every 6-12 months as well as DXA  Indications would be osteoporosis, very low TSH, symptoms    Thyroid nodule  S/p benign FNA of left lobe nodule  US with few small nodules not meeting FNA criteria but will continue to monitor with repeat US 11/2026    Osteopenia  On HRT  DXA stable in 11/2024  Discussed possible need for treatment of hyperthyroidism if bone density worsens over time but getting bone protection from HRT            RTC 1 yr with TSH before visit        Erin Morales MD    Visit today included increased complexity associated with the care of the problems addressed and managing the longitudinal care of the patient due to the serious and/or complex managed problems

## 2025-05-23 NOTE — ASSESSMENT & PLAN NOTE
On HRT  DXA stable in 11/2024  Discussed possible need for treatment of hyperthyroidism if bone density worsens over time but getting bone protection from HRT

## 2025-05-23 NOTE — ASSESSMENT & PLAN NOTE
S/p benign FNA of left lobe nodule  US with few small nodules not meeting FNA criteria but will continue to monitor with repeat US 11/2026

## 2025-06-04 ENCOUNTER — CLINICAL SUPPORT (OUTPATIENT)
Dept: REHABILITATION | Facility: HOSPITAL | Age: 60
End: 2025-06-04
Payer: COMMERCIAL

## 2025-06-04 ENCOUNTER — OFFICE VISIT (OUTPATIENT)
Dept: UROGYNECOLOGY | Facility: CLINIC | Age: 60
End: 2025-06-04
Payer: COMMERCIAL

## 2025-06-04 DIAGNOSIS — D25.9 UTERINE LEIOMYOMA, UNSPECIFIED LOCATION: ICD-10-CM

## 2025-06-04 DIAGNOSIS — N39.46 URINARY INCONTINENCE, MIXED: Primary | ICD-10-CM

## 2025-06-04 DIAGNOSIS — N39.41 URGE INCONTINENCE: ICD-10-CM

## 2025-06-04 DIAGNOSIS — N95.2 VAGINAL ATROPHY: ICD-10-CM

## 2025-06-04 DIAGNOSIS — R35.1 NOCTURIA: ICD-10-CM

## 2025-06-04 DIAGNOSIS — M62.89 PELVIC FLOOR DYSFUNCTION: Primary | ICD-10-CM

## 2025-06-04 DIAGNOSIS — K59.09 CHRONIC CONSTIPATION: ICD-10-CM

## 2025-06-04 PROCEDURE — 97530 THERAPEUTIC ACTIVITIES: CPT | Mod: PO

## 2025-06-04 PROCEDURE — 97112 NEUROMUSCULAR REEDUCATION: CPT | Mod: PO

## 2025-06-04 PROCEDURE — 97110 THERAPEUTIC EXERCISES: CPT | Mod: PO

## 2025-06-05 ENCOUNTER — PATIENT MESSAGE (OUTPATIENT)
Dept: UROGYNECOLOGY | Facility: CLINIC | Age: 60
End: 2025-06-05
Payer: COMMERCIAL

## 2025-07-23 ENCOUNTER — OFFICE VISIT (OUTPATIENT)
Dept: OBSTETRICS AND GYNECOLOGY | Facility: CLINIC | Age: 60
End: 2025-07-23
Payer: COMMERCIAL

## 2025-08-07 ENCOUNTER — PATIENT MESSAGE (OUTPATIENT)
Dept: UROGYNECOLOGY | Facility: CLINIC | Age: 60
End: 2025-08-07
Payer: COMMERCIAL

## 2025-08-10 ENCOUNTER — TELEPHONE (OUTPATIENT)
Dept: UROGYNECOLOGY | Facility: CLINIC | Age: 60
End: 2025-08-10
Payer: COMMERCIAL